# Patient Record
Sex: MALE | Race: WHITE | NOT HISPANIC OR LATINO | Employment: FULL TIME | URBAN - METROPOLITAN AREA
[De-identification: names, ages, dates, MRNs, and addresses within clinical notes are randomized per-mention and may not be internally consistent; named-entity substitution may affect disease eponyms.]

---

## 2018-12-03 ENCOUNTER — TRANSCRIBE ORDERS (OUTPATIENT)
Dept: ADMINISTRATIVE | Facility: HOSPITAL | Age: 58
End: 2018-12-03

## 2018-12-03 DIAGNOSIS — R42 DIZZINESS: Primary | ICD-10-CM

## 2018-12-06 ENCOUNTER — HOSPITAL ENCOUNTER (OUTPATIENT)
Dept: CARDIOLOGY | Facility: HOSPITAL | Age: 58
Discharge: HOME | End: 2018-12-06
Admitting: FAMILY MEDICINE

## 2018-12-06 ENCOUNTER — APPOINTMENT (OUTPATIENT)
Dept: CARDIOLOGY | Facility: HOSPITAL | Age: 58
End: 2018-12-06

## 2018-12-06 DIAGNOSIS — R42 DIZZINESS: ICD-10-CM

## 2018-12-06 PROCEDURE — 93880 EXTRACRANIAL BILAT STUDY: CPT

## 2018-12-07 LAB
BH CV XLRA MEAS LEFT CCA RATIO VEL: -65.7 CM/SEC
BH CV XLRA MEAS LEFT DIST CCA EDV: -27.6 CM/SEC
BH CV XLRA MEAS LEFT DIST CCA PSV: -65.7 CM/SEC
BH CV XLRA MEAS LEFT DIST ICA EDV: -20.5 CM/SEC
BH CV XLRA MEAS LEFT DIST ICA PSV: -48.1 CM/SEC
BH CV XLRA MEAS LEFT ICA RATIO VEL: -44 CM/SEC
BH CV XLRA MEAS LEFT ICA/CCA RATIO: 0.67
BH CV XLRA MEAS LEFT MID ICA EDV: -23.8 CM/SEC
BH CV XLRA MEAS LEFT MID ICA PSV: -50.3 CM/SEC
BH CV XLRA MEAS LEFT PROX CCA EDV: 24.6 CM/SEC
BH CV XLRA MEAS LEFT PROX CCA PSV: 87.4 CM/SEC
BH CV XLRA MEAS LEFT PROX ECA EDV: -29.9 CM/SEC
BH CV XLRA MEAS LEFT PROX ECA PSV: -108 CM/SEC
BH CV XLRA MEAS LEFT PROX ICA EDV: -19.9 CM/SEC
BH CV XLRA MEAS LEFT PROX ICA PSV: -44 CM/SEC
BH CV XLRA MEAS LEFT PROX SCLA EDV: 17.3 CM/SEC
BH CV XLRA MEAS LEFT PROX SCLA PSV: 101 CM/SEC
BH CV XLRA MEAS LEFT VERTEBRAL A EDV: 18.8 CM/SEC
BH CV XLRA MEAS LEFT VERTEBRAL A PSV: 45.7 CM/SEC
BH CV XLRA MEAS RIGHT CCA RATIO VEL: -58.6 CM/SEC
BH CV XLRA MEAS RIGHT DIST CCA EDV: -19.3 CM/SEC
BH CV XLRA MEAS RIGHT DIST CCA PSV: -58.6 CM/SEC
BH CV XLRA MEAS RIGHT DIST ICA EDV: -22.4 CM/SEC
BH CV XLRA MEAS RIGHT DIST ICA PSV: -47.1 CM/SEC
BH CV XLRA MEAS RIGHT ICA RATIO VEL: -44.6 CM/SEC
BH CV XLRA MEAS RIGHT ICA/CCA RATIO: 0.76
BH CV XLRA MEAS RIGHT MID ICA EDV: -16.1 CM/SEC
BH CV XLRA MEAS RIGHT MID ICA PSV: -30.6 CM/SEC
BH CV XLRA MEAS RIGHT PROX CCA EDV: 22.3 CM/SEC
BH CV XLRA MEAS RIGHT PROX CCA PSV: 81.5 CM/SEC
BH CV XLRA MEAS RIGHT PROX ECA EDV: -15.7 CM/SEC
BH CV XLRA MEAS RIGHT PROX ECA PSV: -84.8 CM/SEC
BH CV XLRA MEAS RIGHT PROX ICA EDV: -15.2 CM/SEC
BH CV XLRA MEAS RIGHT PROX ICA PSV: -44.6 CM/SEC
BH CV XLRA MEAS RIGHT PROX SCLA EDV: 12.6 CM/SEC
BH CV XLRA MEAS RIGHT PROX SCLA PSV: 91.1 CM/SEC
BH CV XLRA MEAS RIGHT VERTEBRAL A EDV: 10.6 CM/SEC
BH CV XLRA MEAS RIGHT VERTEBRAL A PSV: 35.7 CM/SEC
LEFT ARM BP: NORMAL MMHG
RIGHT ARM BP: NORMAL MMHG

## 2021-03-22 ENCOUNTER — BULK ORDERING (OUTPATIENT)
Dept: CASE MANAGEMENT | Facility: OTHER | Age: 61
End: 2021-03-22

## 2021-03-22 DIAGNOSIS — Z23 IMMUNIZATION DUE: ICD-10-CM

## 2023-04-06 ENCOUNTER — OFFICE VISIT (OUTPATIENT)
Dept: FAMILY MEDICINE CLINIC | Facility: CLINIC | Age: 63
End: 2023-04-06
Payer: COMMERCIAL

## 2023-04-06 VITALS
TEMPERATURE: 97.7 F | DIASTOLIC BLOOD PRESSURE: 95 MMHG | HEIGHT: 74 IN | HEART RATE: 69 BPM | WEIGHT: 239 LBS | OXYGEN SATURATION: 99 % | BODY MASS INDEX: 30.67 KG/M2 | SYSTOLIC BLOOD PRESSURE: 149 MMHG | RESPIRATION RATE: 16 BRPM

## 2023-04-06 DIAGNOSIS — M79.644 BILATERAL THUMB PAIN: Primary | ICD-10-CM

## 2023-04-06 DIAGNOSIS — M79.645 BILATERAL THUMB PAIN: Primary | ICD-10-CM

## 2023-04-06 DIAGNOSIS — I10 PRIMARY HYPERTENSION: ICD-10-CM

## 2023-04-06 PROBLEM — R73.9 HYPERGLYCEMIA: Status: ACTIVE | Noted: 2023-04-06

## 2023-04-06 PROBLEM — E78.2 MIXED HYPERLIPIDEMIA: Status: ACTIVE | Noted: 2023-04-06

## 2023-04-06 PROCEDURE — 99213 OFFICE O/P EST LOW 20 MIN: CPT | Performed by: FAMILY MEDICINE

## 2023-04-06 RX ORDER — HYDROCHLOROTHIAZIDE 25 MG/1
25 TABLET ORAL DAILY
Qty: 90 TABLET | Refills: 1 | Status: SHIPPED | OUTPATIENT
Start: 2023-04-06

## 2023-04-06 RX ORDER — VALSARTAN 160 MG/1
1 TABLET ORAL DAILY
COMMUNITY
Start: 2023-02-05

## 2023-04-06 RX ORDER — ROSUVASTATIN CALCIUM 10 MG/1
1 TABLET, COATED ORAL DAILY
COMMUNITY
Start: 2023-03-14

## 2023-04-06 RX ORDER — HYDROCHLOROTHIAZIDE 12.5 MG/1
12.5 CAPSULE, GELATIN COATED ORAL EVERY MORNING
COMMUNITY
Start: 2023-01-14 | End: 2023-04-06 | Stop reason: DRUGHIGH

## 2023-04-06 NOTE — PROGRESS NOTES
"Chief Complaint  Hypertension and thumb (Complains \"they do not work\")    Subjective    History of Present Illness {CC  Problem List  Visit  Diagnosis   Encounters  Notes  Medications  Labs  Result Review Imaging  Media :23}     David Robles presents to [facility] for Hypertension and thumb (Complains \"they do not work\").  History of Present Illness   He is here for evaluation of bilateral thumb pain.  He started working at [employer] about 5 months ago.  His initial position required him to do repetitive motions with his hands and thumbs.  About 3 months ago he started with increased pain over bilateral first MCP joints.  The pain is severe at times and his range of motion is also limited.  He has tried Tylenol as needed but this has not been helpful.  He tries to avoid anti-inflammatories because he has a prior history of borderline kidney function.  He has since changed jobs and no longer has to do repetitive motions that involve the use of his thumbs.    He is also here for follow-up of hypertension.  He is currently on valsartan 160 and HCTZ 12.5 mg daily.  His blood pressure at home is generally good first thing in the morning but rises to 140 over 90s during the day.  He denies any headaches, dizziness, or chest pain.      Objective     Vital Signs:   /95 (BP Location: Left arm, Patient Position: Sitting, Cuff Size: Adult)   Pulse 69   Temp 97.7 °F (36.5 °C) (Oral)   Resp 16   Ht 188 cm (74\")   Wt 108 kg (239 lb)   SpO2 99%   BMI 30.69 kg/m²   Body mass index is 30.69 kg/m².     Physical Exam  Constitutional:       General: He is not in acute distress.  Cardiovascular:      Rate and Rhythm: Normal rate and regular rhythm.      Heart sounds: No murmur heard.  Pulmonary:      Effort: No respiratory distress.      Breath sounds: Normal breath sounds.   Musculoskeletal:      Right hand: Tenderness present. Decreased range of motion (1st MCP).      Left hand: " Tenderness present. Decreased range of motion (1st MCP).   Neurological:      General: No focal deficit present.      Mental Status: He is alert.          Result Review  Data Reviewed:{ Labs  Result Review  Imaging  Med Tab  Media :23}                Assessment and Plan {CC Problem List  Visit Diagnosis  ROS  Review (Popup)  Health Maintenance  Quality  BestPractice  Medications  SmartSets  SnapShot Encounters  Media :23}   Diagnoses and all orders for this visit:    1. Bilateral thumb pain (Primary)  -     Ambulatory Referral to Hand Surgery    2. Primary hypertension    Other orders  -     hydroCHLOROthiazide (HYDRODIURIL) 25 MG tablet; Take 1 tablet by mouth Daily.  Dispense: 90 tablet; Refill: 1        Patient Instructions   Increase HCTZ to 25mg daily.  You can use topical voltaren gel for the thumb pain.  We are referring to hand specialist as well for this.  If possible, continue to avoid repetitive motions.    Patient was given instructions and counseling regarding his condition or for health maintenance advice on the AVS.       No follow-ups on file.    Alma Bernal MD

## 2023-04-06 NOTE — PATIENT INSTRUCTIONS
Increase HCTZ to 25mg daily.  You can use topical voltaren gel for the thumb pain.  We are referring to hand specialist as well for this.

## 2023-04-18 RX ORDER — HYDROCHLOROTHIAZIDE 12.5 MG/1
CAPSULE, GELATIN COATED ORAL
Qty: 90 CAPSULE | Refills: 1 | OUTPATIENT
Start: 2023-04-18

## 2023-04-18 NOTE — TELEPHONE ENCOUNTER
Rx Refill Note  Requested Prescriptions     Refused Prescriptions Disp Refills   • hydroCHLOROthiazide (MICROZIDE) 12.5 MG capsule [Pharmacy Med Name: HYDROCHLOROTHIAZIDE 12.5 MG CP] 90 capsule 1     Sig: TAKE 1 CAPSULE BY MOUTH EVERY DAY IN THE MORNING      Last office visit with prescribing clinician: Visit date not found     Next office visit with prescribing clinician: Visit date not found     Refused prescription because it is not the correct dosage.  Correct dosage of 25mg was sent over last week.    Melissa Mix MA  04/18/23, 10:58 EDT

## 2023-04-19 ENCOUNTER — TELEPHONE (OUTPATIENT)
Dept: FAMILY MEDICINE CLINIC | Facility: CLINIC | Age: 63
End: 2023-04-19

## 2023-04-19 NOTE — TELEPHONE ENCOUNTER
OK for HUB to read.  Pt has no  set up unable to leave message.  Referral was sent [facility]. Pt can call them to check status at [phone].

## 2023-04-19 NOTE — TELEPHONE ENCOUNTER
Caller: David Robles    Relationship to patient: Self    Best call back number: [phone]    Patient is needing: PATIENT IS REQUESTING A CALLBACK FOR AN UPDATE ON THE REFERRAL TO A HAND SURGEON.

## 2023-05-15 ENCOUNTER — OFFICE VISIT (OUTPATIENT)
Dept: FAMILY MEDICINE CLINIC | Facility: CLINIC | Age: 63
End: 2023-05-15
Payer: COMMERCIAL

## 2023-05-15 VITALS
WEIGHT: 235.9 LBS | OXYGEN SATURATION: 98 % | DIASTOLIC BLOOD PRESSURE: 80 MMHG | HEART RATE: 80 BPM | SYSTOLIC BLOOD PRESSURE: 116 MMHG | HEIGHT: 74 IN | TEMPERATURE: 98.4 F | BODY MASS INDEX: 30.27 KG/M2 | RESPIRATION RATE: 16 BRPM

## 2023-05-15 DIAGNOSIS — E78.2 MIXED HYPERLIPIDEMIA: ICD-10-CM

## 2023-05-15 DIAGNOSIS — Z86.73 HISTORY OF CVA (CEREBROVASCULAR ACCIDENT): Primary | ICD-10-CM

## 2023-05-15 DIAGNOSIS — I10 PRIMARY HYPERTENSION: ICD-10-CM

## 2023-05-15 RX ORDER — ATORVASTATIN CALCIUM 20 MG/1
20 TABLET, FILM COATED ORAL EVERY EVENING
COMMUNITY
Start: 2023-05-12 | End: 2023-05-15 | Stop reason: SDUPTHER

## 2023-05-15 RX ORDER — ATORVASTATIN CALCIUM 20 MG/1
20 TABLET, FILM COATED ORAL EVERY EVENING
Qty: 90 TABLET | Refills: 1 | Status: SHIPPED | OUTPATIENT
Start: 2023-05-15

## 2023-05-15 NOTE — PATIENT INSTRUCTIONS
Continue your current medications--plavix, aspirin, valsaran, and HCTZ.    Begin atorvastatin as recommended at discharge.  Do the zio patch to evaluate for atrial fibrillation.  Follow up with neurologist as scheduled.

## 2023-05-15 NOTE — PROGRESS NOTES
"Chief Complaint  Hospital Follow Up Visit (Stroke )    Subjective    History of Present Illness {CC  Problem List  Visit  Diagnosis   Encounters  Notes  Medications  Labs  Result Review Imaging  Media :23}     David Robles presents to [facility] for Hospital Follow Up Visit (Stroke ).  History of Present Illness   He presents for hospital follow-up.  He was admitted at [facility] last week for 1 night.  Hospital records are not available due to [place] computer issue.  He awoke with right arm heaviness.  He initially thought it was related to sleeping on it wrong but it persisted and he just felt off so he went to the emergency room and was discovered to have a stroke.  He reports that his labs and testing in the hospital did not reveal an etiology for the stroke.  He reports that carotid ultrasound did show small amount of plaque but nothing hemodynamically significant.  He reports an echocardiogram was also normal.  His symptoms resolved in less than 24 hours.      He was discharged on Plavix to take in addition to aspirin.  Prior to his hospitalization he had already been taking aspirin daily.  The only other medication change that was made was the discontinuation of rosuvastatin and addition of atorvastatin.  However he reports that he was not given atorvastatin so he does need a prescription for this.  Since discharge he has had no symptoms at all.    He is scheduled to follow-up in with neurologist in 4 to 6 weeks.  He is also scheduled to do wear a patch to check for atrial relation or other arrhythmia.      Objective     Vital Signs:   /80 (BP Location: Left arm, Patient Position: Sitting, Cuff Size: Adult)   Pulse 80   Temp 98.4 °F (36.9 °C) (Oral)   Resp 16   Ht 188 cm (74\")   Wt 107 kg (235 lb 14.4 oz)   SpO2 98%   BMI 30.29 kg/m²   Body mass index is 30.29 kg/m².     Physical Exam  Constitutional:       General: He is not in acute distress.     " Appearance: Normal appearance.   Cardiovascular:      Rate and Rhythm: Normal rate and regular rhythm.      Pulses: Normal pulses.      Heart sounds: No murmur heard.  Pulmonary:      Effort: No respiratory distress.      Breath sounds: Normal breath sounds.   Neurological:      General: No focal deficit present.   Psychiatric:         Behavior: Behavior normal.          Result Review  Data Reviewed:{ Labs  Result Review  Imaging  Med Tab  Media :23}                Assessment and Plan {CC Problem List  Visit Diagnosis  ROS  Review (Popup)  Health Maintenance  Quality  BestPractice  Medications  SmartSets  SnapShot Encounters  Media :23}   Diagnoses and all orders for this visit:    1. History of CVA (cerebrovascular accident) (Primary)    2. Primary hypertension    3. Mixed hyperlipidemia    Other orders  -     atorvastatin (LIPITOR) 20 MG tablet; Take 1 tablet by mouth Every Evening.  Dispense: 90 tablet; Refill: 1        Patient Instructions   Continue your current medications--plavix, aspirin, valsaran, and HCTZ.    Begin atorvastatin as recommended at discharge.  Do the zio patch to evaluate for atrial fibrillation.  Follow up with neurologist as scheduled.         Patient was given instructions and counseling regarding his condition or for health maintenance advice on the AVS.       Return in about 3 months (around 8/15/2023) for Recheck.  Plan fasting labs at next visit.  Call sooner if needed.    Alma Bernal MD

## 2023-05-17 ENCOUNTER — TELEPHONE (OUTPATIENT)
Dept: FAMILY MEDICINE CLINIC | Facility: CLINIC | Age: 63
End: 2023-05-17
Payer: COMMERCIAL

## 2023-05-17 NOTE — TELEPHONE ENCOUNTER
Plavix and Clopidrogel are the same medication.  When I saw him in the office he stated that he had Plavix from the pharmacy.  If this is not correct and he needs a prescription we can certainly send that over for him.

## 2023-05-17 NOTE — TELEPHONE ENCOUNTER
Caller: David Robles    Relationship: Self    Best call back number: [phone]    What medication are you requesting: PLAVIX    If a prescription is needed, what is your preferred pharmacy and phone number: [facility]Y #[id number] - [place] [address] AT [facility] - [phone]  - [phone] FX     Additional notes: PATIENT STATES THAT HE HAD A STROKE LAST WEEK. WHILE IN THE HOSPITAL, THEY CHANGED SOME OF HIS MEDICATIONS, AND ADVISED OTHERS. HE GOT A NEW STATIN, BUT WAS ALSO ADVISED TO START TAKING PLAVIX AS A BLOOD THINNER. REQUESTS PRESCRIPTION FOR MEDICATION TO BE SENT TO PHARMACY. PLEASE CALL AND ADVISE

## 2023-05-18 RX ORDER — CLOPIDOGREL BISULFATE 75 MG/1
75 TABLET ORAL DAILY
Qty: 30 TABLET | Refills: 1 | Status: SHIPPED | OUTPATIENT
Start: 2023-05-18

## 2023-05-18 NOTE — TELEPHONE ENCOUNTER
Pt called back, he does not have a script at all for Plavix/Clopidrogel. Can you please send to [facility] on [address]?

## 2023-05-18 NOTE — TELEPHONE ENCOUNTER
Pt said that he doesn't know what I'm talking about. He doesn't know the name of his medications. He said he would have his wife come in and show me all of his medications, but she might not. Not sure what to do at this point. Will wait for a response from patient or his wife

## 2023-06-11 RX ORDER — CLOPIDOGREL BISULFATE 75 MG/1
TABLET ORAL
Qty: 30 TABLET | Refills: 2 | Status: SHIPPED | OUTPATIENT
Start: 2023-06-11

## 2023-06-12 ENCOUNTER — TELEPHONE (OUTPATIENT)
Dept: FAMILY MEDICINE CLINIC | Facility: CLINIC | Age: 63
End: 2023-06-12
Payer: COMMERCIAL

## 2023-06-12 NOTE — TELEPHONE ENCOUNTER
Pts BP medicine was doubled because his BP was running high.  Now it's running low 86/55.  He wants to know if he needs to decrease it again or come in?

## 2023-06-12 NOTE — TELEPHONE ENCOUNTER
Just to clarify....  His blood pressure medication in the chart is valsartan 160mg and hydrochlorothiazide 25mg.  It looks like his hydrochlorothiazide was doubled in April.  It think it is reasonable to cut this back down to 12.5mg but please schedule a bp check with Dr. Bernal in follow up.

## 2023-06-13 NOTE — TELEPHONE ENCOUNTER
Dr. Saldana said:    As long as he feels better with the change, it can be left in August       Patient is aware

## 2023-06-13 NOTE — TELEPHONE ENCOUNTER
Pt aware, he's going to start cutting his hctz pill in half and see if that will help. He said he's feeling a lot better, so I don't know if you want him to keep his already scheduled appt in August or do you want him to be seen sooner? I told him to call back if he starts feeling lightheaded/lethargic again.

## 2023-06-15 RX ORDER — ROSUVASTATIN CALCIUM 10 MG/1
TABLET, COATED ORAL
Qty: 90 TABLET | Refills: 1 | OUTPATIENT
Start: 2023-06-15

## 2023-08-07 RX ORDER — VALSARTAN 160 MG/1
TABLET ORAL
Qty: 90 TABLET | Refills: 0 | Status: SHIPPED | OUTPATIENT
Start: 2023-08-07

## 2023-08-07 NOTE — TELEPHONE ENCOUNTER
Rx Refill Note  Requested Prescriptions     Pending Prescriptions Disp Refills    valsartan (DIOVAN) 160 MG tablet [Pharmacy Med Name: VALSARTAN 160 MG TABLET] 90 tablet 1     Sig: TAKE 1 TABLET BY MOUTH EVERY DAY      Last office visit with prescribing clinician: 5/15/2023   Last telemedicine visit with prescribing clinician: Visit date not found   Next office visit with prescribing clinician: 8/16/2023                         Would you like a call back once the refill request has been completed: [] Yes [] No    If the office needs to give you a call back, can they leave a voicemail: [] Yes [] No    Haily Gaston MA  08/07/23, 08:14 EDT

## 2023-08-16 ENCOUNTER — OFFICE VISIT (OUTPATIENT)
Dept: FAMILY MEDICINE CLINIC | Facility: CLINIC | Age: 63
End: 2023-08-16
Payer: COMMERCIAL

## 2023-08-16 VITALS
DIASTOLIC BLOOD PRESSURE: 78 MMHG | SYSTOLIC BLOOD PRESSURE: 115 MMHG | OXYGEN SATURATION: 99 % | WEIGHT: 214 LBS | BODY MASS INDEX: 27.46 KG/M2 | HEART RATE: 63 BPM | HEIGHT: 74 IN

## 2023-08-16 DIAGNOSIS — Z86.73 HISTORY OF CVA (CEREBROVASCULAR ACCIDENT): ICD-10-CM

## 2023-08-16 DIAGNOSIS — I10 PRIMARY HYPERTENSION: Primary | ICD-10-CM

## 2023-08-16 DIAGNOSIS — E78.2 MIXED HYPERLIPIDEMIA: ICD-10-CM

## 2023-08-16 PROCEDURE — 99214 OFFICE O/P EST MOD 30 MIN: CPT | Performed by: FAMILY MEDICINE

## 2023-08-16 RX ORDER — FLUTICASONE PROPIONATE 50 MCG
2 SPRAY, SUSPENSION (ML) NASAL 2 TIMES DAILY
COMMUNITY
Start: 2023-08-04

## 2023-08-16 NOTE — PATIENT INSTRUCTIONS
Continue your current medications, healthy diet and regular exercise.  Per neurologist, you can stop the plavix after 90 days.  Order given for labs. You should receive a call or my chart message with your test results. If you have not received your results after 7-10 days, please contact the office.    Continue to monitor blood pressure and if remains low, try reducing the HCTZ to 1/2 a pill daily instead of stopping entirely.

## 2023-08-16 NOTE — PROGRESS NOTES
"Chief Complaint  Cerebrovascular Accident (F/u), Hypertension, and Hyperlipidemia    Subjective    History of Present Illness {CC  Problem List  Visit  Diagnosis   Encounters  Notes  Medications  Labs  Result Review Imaging  Media :23}     David Robles presents to [facility] for Cerebrovascular Accident (F/u), Hypertension, and Hyperlipidemia.  Cerebrovascular Accident    Hypertension  Hypertensive end-organ damage includes CVA.   Hyperlipidemia     He presents for follow up of stroke.  He has had no further symptoms.  He is exercising regularly and has changed his diet and lost more than 20 pounds. He is currently on aspirin and plavix. He was seen by neurologist and told that after 90 days he can discontinue the plavix and remain only on aspirin.     He is also here for follow up of hypertension.  He is currently on valsartan 160 and HCTZ 25.  He had some low blood pressure and tried stopping the HCTZ but his blood pressure went back to systolic of 150-160s so he resumed it and has not had further issue.      He is also here for follow up of hyperlipidemia. He is on rosuvastatin 10mg daily and reports good compliance and tolerability. He is due recheck, but his insurance does not cover [facility] so he is requesting an order.       Objective     Vital Signs:   /78   Pulse 63   Ht 188 cm (74\")   Wt 97.1 kg (214 lb)   SpO2 99%   BMI 27.48 kg/mý   Body mass index is 27.48 kg/mý.     Physical Exam  Constitutional:       General: He is not in acute distress.  Cardiovascular:      Rate and Rhythm: Normal rate and regular rhythm.      Heart sounds: No murmur heard.  Pulmonary:      Effort: No respiratory distress.      Breath sounds: Normal breath sounds.   Neurological:      General: No focal deficit present.      Mental Status: He is alert.   Psychiatric:         Behavior: Behavior normal.        Result Review  Data Reviewed:{ Labs  Result Review  Imaging  Med Tab  " Media :23}                Assessment and Plan {CC Problem List  Visit Diagnosis  ROS  Review (Popup)  Health Maintenance  Quality  BestPractice  Medications  SmartSets  SnapShot Encounters  Media :23}   Diagnoses and all orders for this visit:    1. Primary hypertension (Primary)  -     Comprehensive Metabolic Panel; Future    2. Mixed hyperlipidemia  -     Lipid Panel; Future    3. History of CVA (cerebrovascular accident)        Patient Instructions   Continue your current medications, healthy diet and regular exercise.  Per neurologist, you can stop the plavix after 90 days.  Order given for labs. You should receive a call or my chart message with your test results. If you have not received your results after 7-10 days, please contact the office.    Continue to monitor blood pressure and if remains low, try reducing the HCTZ to 1/2 a pill daily instead of stopping entirely.       Patient was given instructions and counseling regarding his condition or for health maintenance advice on the AVS.       Return in about 6 months (around 2/16/2024) for Annual.    Alma Bernal MD

## 2023-09-07 RX ORDER — CLOPIDOGREL BISULFATE 75 MG/1
TABLET ORAL
Qty: 90 TABLET | Refills: 0 | Status: SHIPPED | OUTPATIENT
Start: 2023-09-07

## 2023-10-02 ENCOUNTER — TELEPHONE (OUTPATIENT)
Dept: FAMILY MEDICINE CLINIC | Facility: CLINIC | Age: 63
End: 2023-10-02

## 2023-10-02 NOTE — TELEPHONE ENCOUNTER
Caller: David Robles    Relationship: Self    Best call back number: [phone]     What is the best time to reach you: ANYTIME     Who are you requesting to speak with (clinical staff, provider,  specific staff member):  STAFF    What was the call regarding: PATIENT IS REQUESTING TO BE WORKED IN FOR A PHYSICAL PRIOR TO THE FIRST OF THE YEAR. ..PLEASE ADVISE

## 2023-10-05 RX ORDER — HYDROCHLOROTHIAZIDE 25 MG/1
TABLET ORAL
Qty: 90 TABLET | Refills: 1 | Status: SHIPPED | OUTPATIENT
Start: 2023-10-05

## 2023-10-05 NOTE — TELEPHONE ENCOUNTER
Rx Refill Note  Requested Prescriptions     Pending Prescriptions Disp Refills    hydroCHLOROthiazide (HYDRODIURIL) 25 MG tablet [Pharmacy Med Name: HYDROCHLOROTHIAZIDE 25 MG TAB] 90 tablet 1     Sig: TAKE 1 TABLET BY MOUTH EVERY DAY      Last office visit with prescribing clinician: 8/16/2023   Last telemedicine visit with prescribing clinician: Visit date not found   Next office visit with prescribing clinician: 12/8/2023       Ermelinda Taveras  10/05/23, 08:01 EDT

## 2023-11-02 ENCOUNTER — TELEPHONE (OUTPATIENT)
Dept: FAMILY MEDICINE CLINIC | Facility: CLINIC | Age: 63
End: 2023-11-02

## 2023-11-02 DIAGNOSIS — Z12.11 SCREENING FOR COLON CANCER: Primary | ICD-10-CM

## 2023-11-02 NOTE — TELEPHONE ENCOUNTER
I placed the referral for colonoscopy with his requested dates. I'm not sure how far out they are scheduling so can't guarantee those dates, but will try.

## 2023-11-02 NOTE — TELEPHONE ENCOUNTER
Caller: David Robles    Relationship: Self    Best call back number: [phone]     What is the medical concern/diagnosis: COLONOSCOPY    Any additional details: PATIENT REQUESTS A REFERRAL FOR A COLONOSCOPY. HE WOULD LIKE TO SCHEDULE IT FOR 12/6, 12/7,12/8, 12/11, OR 12/12. PLEASE CALL TO FOLLOW UP

## 2023-11-10 RX ORDER — VALSARTAN 160 MG/1
TABLET ORAL
Qty: 90 TABLET | Refills: 3 | Status: SHIPPED | OUTPATIENT
Start: 2023-11-10

## 2023-11-10 NOTE — TELEPHONE ENCOUNTER
Rx Refill Note  Requested Prescriptions     Pending Prescriptions Disp Refills    valsartan (DIOVAN) 160 MG tablet [Pharmacy Med Name: VALSARTAN 160 MG TABLET] 90 tablet 0     Sig: TAKE 1 TABLET BY MOUTH EVERY DAY      Last office visit with prescribing clinician: 8/16/2023   Last telemedicine visit with prescribing clinician: Visit date not found   Next office visit with prescribing clinician: 12/8/2023       Ermelinda Taveras  11/10/23, 08:10 EST

## 2023-12-04 RX ORDER — ATORVASTATIN CALCIUM 20 MG/1
20 TABLET, FILM COATED ORAL EVERY EVENING
Qty: 90 TABLET | Refills: 3 | Status: SHIPPED | OUTPATIENT
Start: 2023-12-04

## 2023-12-04 RX ORDER — CLOPIDOGREL BISULFATE 75 MG/1
TABLET ORAL
Qty: 90 TABLET | Refills: 0 | Status: SHIPPED | OUTPATIENT
Start: 2023-12-04 | End: 2023-12-08 | Stop reason: ALTCHOICE

## 2023-12-04 NOTE — TELEPHONE ENCOUNTER
Rx Refill Note  Requested Prescriptions     Pending Prescriptions Disp Refills    atorvastatin (LIPITOR) 20 MG tablet [Pharmacy Med Name: ATORVASTATIN 20 MG TABLET] 90 tablet 1     Sig: TAKE 1 TABLET BY MOUTH EVERY DAY IN THE EVENING      Last office visit with prescribing clinician: 8/16/2023   Last telemedicine visit with prescribing clinician: Visit date not found   Next office visit with prescribing clinician: 12/8/2023       Ermelinda Taveras  12/04/23, 08:15 EST

## 2023-12-08 ENCOUNTER — OFFICE VISIT (OUTPATIENT)
Dept: FAMILY MEDICINE CLINIC | Facility: CLINIC | Age: 63
End: 2023-12-08
Payer: COMMERCIAL

## 2023-12-08 ENCOUNTER — TELEPHONE (OUTPATIENT)
Dept: FAMILY MEDICINE CLINIC | Facility: CLINIC | Age: 63
End: 2023-12-08

## 2023-12-08 VITALS
WEIGHT: 203 LBS | HEART RATE: 65 BPM | DIASTOLIC BLOOD PRESSURE: 61 MMHG | BODY MASS INDEX: 26.05 KG/M2 | OXYGEN SATURATION: 100 % | HEIGHT: 74 IN | SYSTOLIC BLOOD PRESSURE: 92 MMHG

## 2023-12-08 DIAGNOSIS — Z86.73 HISTORY OF CVA (CEREBROVASCULAR ACCIDENT): ICD-10-CM

## 2023-12-08 DIAGNOSIS — R73.9 HYPERGLYCEMIA: ICD-10-CM

## 2023-12-08 DIAGNOSIS — Z00.00 WELL ADULT EXAM: Primary | ICD-10-CM

## 2023-12-08 DIAGNOSIS — Z12.5 SCREENING PSA (PROSTATE SPECIFIC ANTIGEN): ICD-10-CM

## 2023-12-08 DIAGNOSIS — I10 PRIMARY HYPERTENSION: ICD-10-CM

## 2023-12-08 DIAGNOSIS — E78.2 MIXED HYPERLIPIDEMIA: ICD-10-CM

## 2023-12-08 PROCEDURE — 99397 PER PM REEVAL EST PAT 65+ YR: CPT | Performed by: FAMILY MEDICINE

## 2023-12-08 RX ORDER — IMIQUIMOD 12.5 MG/.25G
CREAM TOPICAL
COMMUNITY
Start: 2023-11-09

## 2023-12-08 NOTE — TELEPHONE ENCOUNTER
Pt states he is taking these meds  Valsartan 160 mg  Atorvastatin 20 mg  Hctz 25 mg   Low dose aspirin

## 2023-12-08 NOTE — PATIENT INSTRUCTIONS
Continue your current medications.   Aim for 150 minutes of aerobic exercise weekly.  You had lab tests today. You should receive a call or my chart message with your test results. If you have not received your results in the next 7-10 days, please contact the office.    Continue to monitor blood pressure. If staying below 110/70, can try reducing HCTZ dose.  LDL cholesterol goal is under 70.  Proceed with colonoscopy as planned. If you need help scheduling, let us know.

## 2023-12-08 NOTE — PROGRESS NOTES
Chief Complaint  Annual Exam    Subjective    History of Present Illness {CC  Problem List  Visit  Diagnosis   Encounters  Notes  Medications  Labs  Result Review Imaging  Media :23}     David Robles presents to [facility] for Annual Exam.  He is  and has 5 grown children. He works full time in [employer]. He is due colonoscopy and has the paperwork to schedule. His last PSA was last year and he would like PSA today. He has never been a smoker. He is up to date on dental and eye exams.  He is exercising daily. He had biometric screening for work last month and he questions the validity of the testing. He has been working on diet and exercise and is fastign for labs.     Their chronic medical conditions were reviewed and are stable unless noted otherwise below. He has a history of hypertension, hyperglycemia, and hyperlipidemia. He also has a history of CVA 5/2023 and has done well. He took plavix for 90 days and is now on low dose aspirin. He has a zio monitor to assess for atrial fibrillation. He has had no palpitations and so far no afib has been identified. His blood pressure has been low normal, but he denies any dizziness.    History of Present Illness     Social History     Socioeconomic History    Marital status:    Tobacco Use    Smoking status: Never    Smokeless tobacco: Never   Vaping Use    Vaping Use: Never used   Substance and Sexual Activity    Alcohol use: Never    Drug use: Never    Sexual activity: Defer     Partners: Female        Review of Systems   Constitutional:  Negative for fatigue.   HENT:  Negative for ear pain.    Eyes:  Negative for pain and visual disturbance.   Respiratory:  Negative for cough and shortness of breath.    Cardiovascular:  Negative for chest pain and palpitations.   Gastrointestinal:  Negative for abdominal pain, constipation and diarrhea.   Genitourinary:  Negative for dysuria.   Musculoskeletal:  Positive for  "arthralgias (thumb and elbow pain).   Skin:  Negative for rash.   Allergic/Immunologic: Negative for environmental allergies.   Neurological:  Negative for dizziness and headaches.   Psychiatric/Behavioral:  Negative for dysphoric mood. The patient is not nervous/anxious.         Objective       Vital Signs:   BP 92/61 (BP Location: Left arm, Patient Position: Sitting, Cuff Size: Adult)   Pulse 65   Ht 188 cm (74.02\")   Wt 92.1 kg (203 lb)   SpO2 100%   BMI 26.05 kg/m²     Body mass index is 26.05 kg/m².     PHQ-9 Depression Screening  Little interest or pleasure in doing things?     Feeling down, depressed, or hopeless?     Trouble falling or staying asleep, or sleeping too much?     Feeling tired or having little energy?     Poor appetite or overeating?     Feeling bad about yourself - or that you are a failure or have let yourself or your family down?     Trouble concentrating on things, such as reading the newspaper or watching television?     Moving or speaking so slowly that other people could have noticed? Or the opposite - being so fidgety or restless that you have been moving around a lot more than usual?     Thoughts that you would be better off dead, or of hurting yourself in some way?     PHQ-9 Total Score     If you checked off any problems, how difficult have these problems made it for you to do your work, take care of things at home, or get along with other people?           Physical Exam  Constitutional:       General: He is not in acute distress.  HENT:      Head: Normocephalic and atraumatic.      Nose: No rhinorrhea.      Mouth/Throat:      Mouth: Mucous membranes are moist.   Eyes:      Extraocular Movements: Extraocular movements intact.      Conjunctiva/sclera: Conjunctivae normal.   Cardiovascular:      Rate and Rhythm: Normal rate and regular rhythm.      Heart sounds: No murmur heard.  Pulmonary:      Effort: No respiratory distress.      Breath sounds: Normal breath sounds. "   Abdominal:      General: There is no distension.      Palpations: Abdomen is soft.      Tenderness: There is no abdominal tenderness.   Musculoskeletal:      Right lower leg: No edema.      Left lower leg: No edema.   Lymphadenopathy:      Cervical: No cervical adenopathy.   Skin:     General: Skin is warm.   Neurological:      General: No focal deficit present.      Mental Status: He is alert.   Psychiatric:         Behavior: Behavior normal.          Result Review  Data Reviewed:{ Labs  Result Review  Imaging  Med Tab  Media :23}                Assessment and Plan {CC Problem List  Visit Diagnosis  ROS  Review (Popup)  Health Maintenance  Quality  BestPractice  Medications  SmartSets  SnapShot Encounters  Media :23}   Diagnoses and all orders for this visit:    1. Well adult exam (Primary)  -     Comprehensive Metabolic Panel  -     Lipid Panel  -     Hemoglobin A1c  -     PSA Screen    2. Primary hypertension  -     Comprehensive Metabolic Panel    3. Mixed hyperlipidemia  -     Lipid Panel    4. Hyperglycemia  -     Comprehensive Metabolic Panel  -     Hemoglobin A1c    5. History of CVA (cerebrovascular accident)    6. Screening PSA (prostate specific antigen)  -     PSA Screen        Patient Instructions   Continue your current medications.   Aim for 150 minutes of aerobic exercise weekly.  You had lab tests today. You should receive a call or my chart message with your test results. If you have not received your results in the next 7-10 days, please contact the office.    Continue to monitor blood pressure. If staying below 110/70, can try reducing HCTZ dose.  LDL cholesterol goal is under 70.  Proceed with colonoscopy as planned. If you need help scheduling, let us know.     Routine health maintenance, immunizations and cancer screenings were discussed and encouraged.  He declines vaccines today but will consider.    Patient was given instructions and counseling regarding his condition or  for health maintenance advice on the AVS.       No follow-ups on file.    Alma Bernal MD

## 2023-12-09 LAB
ALBUMIN SERPL-MCNC: 4.8 G/DL (ref 3.9–4.9)
ALBUMIN/GLOB SERPL: 2 {RATIO} (ref 1.2–2.2)
ALP SERPL-CCNC: 63 IU/L (ref 44–121)
ALT SERPL-CCNC: 20 IU/L (ref 0–44)
AST SERPL-CCNC: 23 IU/L (ref 0–40)
BILIRUB SERPL-MCNC: 1 MG/DL (ref 0–1.2)
BUN SERPL-MCNC: 23 MG/DL (ref 8–27)
BUN/CREAT SERPL: 19 (ref 10–24)
CALCIUM SERPL-MCNC: 9.6 MG/DL (ref 8.6–10.2)
CHLORIDE SERPL-SCNC: 102 MMOL/L (ref 96–106)
CHOLEST SERPL-MCNC: 109 MG/DL (ref 100–199)
CO2 SERPL-SCNC: 23 MMOL/L (ref 20–29)
CREAT SERPL-MCNC: 1.19 MG/DL (ref 0.76–1.27)
EGFRCR SERPLBLD CKD-EPI 2021: 68 ML/MIN/1.73
GLOBULIN SER CALC-MCNC: 2.4 G/DL (ref 1.5–4.5)
GLUCOSE SERPL-MCNC: 98 MG/DL (ref 70–99)
HBA1C MFR BLD: 6.1 % (ref 4.8–5.6)
HDLC SERPL-MCNC: 43 MG/DL
LDLC SERPL CALC-MCNC: 46 MG/DL (ref 0–99)
POTASSIUM SERPL-SCNC: 4.7 MMOL/L (ref 3.5–5.2)
PROT SERPL-MCNC: 7.2 G/DL (ref 6–8.5)
PSA SERPL-MCNC: 0.6 NG/ML (ref 0–4)
SODIUM SERPL-SCNC: 140 MMOL/L (ref 134–144)
TRIGL SERPL-MCNC: 111 MG/DL (ref 0–149)
VLDLC SERPL CALC-MCNC: 20 MG/DL (ref 5–40)

## 2024-03-27 RX ORDER — HYDROCHLOROTHIAZIDE 25 MG/1
TABLET ORAL
Qty: 90 TABLET | Refills: 1 | Status: SHIPPED | OUTPATIENT
Start: 2024-03-27

## 2024-04-01 ENCOUNTER — PATIENT MESSAGE (OUTPATIENT)
Dept: FAMILY MEDICINE CLINIC | Facility: CLINIC | Age: 64
End: 2024-04-01
Payer: COMMERCIAL

## 2024-06-10 ENCOUNTER — OFFICE VISIT (OUTPATIENT)
Dept: FAMILY MEDICINE CLINIC | Facility: CLINIC | Age: 64
End: 2024-06-10
Payer: COMMERCIAL

## 2024-06-10 VITALS
DIASTOLIC BLOOD PRESSURE: 71 MMHG | WEIGHT: 211.3 LBS | BODY MASS INDEX: 27.12 KG/M2 | HEART RATE: 69 BPM | OXYGEN SATURATION: 97 % | SYSTOLIC BLOOD PRESSURE: 106 MMHG | HEIGHT: 74 IN | RESPIRATION RATE: 16 BRPM

## 2024-06-10 DIAGNOSIS — R73.9 HYPERGLYCEMIA: ICD-10-CM

## 2024-06-10 DIAGNOSIS — Z86.73 HISTORY OF CVA (CEREBROVASCULAR ACCIDENT): ICD-10-CM

## 2024-06-10 DIAGNOSIS — I48.0 PAROXYSMAL ATRIAL FIBRILLATION: ICD-10-CM

## 2024-06-10 DIAGNOSIS — I10 PRIMARY HYPERTENSION: Primary | ICD-10-CM

## 2024-06-10 DIAGNOSIS — Z79.01 CHRONIC ANTICOAGULATION: ICD-10-CM

## 2024-06-10 DIAGNOSIS — E78.2 MIXED HYPERLIPIDEMIA: ICD-10-CM

## 2024-06-10 PROCEDURE — 99214 OFFICE O/P EST MOD 30 MIN: CPT | Performed by: FAMILY MEDICINE

## 2024-06-10 RX ORDER — DIPHENOXYLATE HYDROCHLORIDE AND ATROPINE SULFATE 2.5; .025 MG/1; MG/1
1 TABLET ORAL DAILY
COMMUNITY

## 2024-06-10 RX ORDER — RIVAROXABAN 20 MG/1
20 TABLET, FILM COATED ORAL
COMMUNITY

## 2024-06-10 RX ORDER — CHLORAL HYDRATE 500 MG
3000 CAPSULE ORAL 2 TIMES DAILY WITH MEALS
COMMUNITY

## 2024-06-10 NOTE — PATIENT INSTRUCTIONS
Continue your current medications, healthy diet and regular exercise.  You had lab tests today. You should receive a call or my chart message with your test results. If you have not received your results in the next 7-10 days, please contact the office.    Follow up with cardiology yearly.

## 2024-06-10 NOTE — PROGRESS NOTES
"Chief Complaint  Hypertension    Subjective    History of Present Illness {CC  Problem List  Visit  Diagnosis   Encounters  Notes  Medications  Labs  Result Review Imaging  Media :23}     David Robles presents to [facility] for Hypertension.  Hypertension       He presents for follow-up of hypertension.  His blood pressure has been well-controlled on valsartan 160 mg and hydrochlorothiazide 25 mg daily.  He denies any headaches, dizziness, or chest pain.    He is also here for follow-up of hyperlipidemia.  He is currently on atorvastatin 20 mg daily.  He reports good compliance and tolerability of the medication.  His last cholesterol was 109 with LDL of 46, HDL 43, and triglycerides of 111.  He is fasting for recheck today.    He also has a history of hyperglycemia his last A1c was 6.1.  He is not on medication for his sugar but is trying to control this through diet and exercise.    He also has a history of CVA and he is on monitor did show at least 2 episodes of atrial fibrillation.  He has been changed to Xarelto and is no longer taking the aspirin.  He denies any chest pain or palpitations.  He last saw the cardiologist in March and was told to recheck in 1 year.      Objective     Vital Signs:   /71   Pulse 69   Resp 16   Ht 188 cm (74.02\")   Wt 95.8 kg (211 lb 4.8 oz)   SpO2 97%   BMI 27.11 kg/m²   Body mass index is 27.11 kg/m².     Physical Exam  Constitutional:       General: He is not in acute distress.  Cardiovascular:      Rate and Rhythm: Normal rate and regular rhythm.      Heart sounds: No murmur heard.  Pulmonary:      Effort: No respiratory distress.      Breath sounds: Normal breath sounds.   Neurological:      General: No focal deficit present.      Mental Status: He is alert.   Psychiatric:         Behavior: Behavior normal.          Result Review  Data Reviewed:{ Labs  Result Review  Imaging  Med Tab  Media :23}                Assessment " and Plan {CC Problem List  Visit Diagnosis  ROS  Review (Popup)  Health Maintenance  Quality  BestPractice  Medications  SmartSets  SnapShot Encounters  Media :23}   Diagnoses and all orders for this visit:    1. Primary hypertension (Primary)  Comments:  Continue valsartan and HCTZ.  Orders:  -     Comprehensive Metabolic Panel    2. Mixed hyperlipidemia  Comments:  Continue atorvastatin.  Orders:  -     Lipid Panel    3. Hyperglycemia  -     Hemoglobin A1c    4. Paroxysmal atrial fibrillation  Comments:  Continue Xarelto.    5. History of CVA (cerebrovascular accident)    6. Chronic anticoagulation  -     CBC & Differential        Patient Instructions   Continue your current medications, healthy diet and regular exercise.  You had lab tests today. You should receive a call or my chart message with your test results. If you have not received your results in the next 7-10 days, please contact the office.    Follow up with cardiology yearly.         Patient was given instructions and counseling regarding his condition or for health maintenance advice on the AVS.       Return in about 6 months (around 12/10/2024) for Annual.    Alma Bernal MD

## 2024-06-11 LAB
ALBUMIN SERPL-MCNC: 4.7 G/DL (ref 3.5–5.2)
ALBUMIN/GLOB SERPL: 2.2 G/DL
ALP SERPL-CCNC: 55 U/L (ref 39–117)
ALT SERPL-CCNC: 17 U/L (ref 1–41)
AST SERPL-CCNC: 21 U/L (ref 1–40)
BASOPHILS # BLD AUTO: 0.02 10*3/MM3 (ref 0–0.2)
BASOPHILS NFR BLD AUTO: 0.3 % (ref 0–1.5)
BILIRUB SERPL-MCNC: 1 MG/DL (ref 0–1.2)
BUN SERPL-MCNC: 22 MG/DL (ref 8–23)
BUN/CREAT SERPL: 22.2 (ref 7–25)
CALCIUM SERPL-MCNC: 9.7 MG/DL (ref 8.6–10.5)
CHLORIDE SERPL-SCNC: 104 MMOL/L (ref 98–107)
CHOLEST SERPL-MCNC: 120 MG/DL (ref 0–200)
CO2 SERPL-SCNC: 26.3 MMOL/L (ref 22–29)
CREAT SERPL-MCNC: 0.99 MG/DL (ref 0.76–1.27)
EGFRCR SERPLBLD CKD-EPI 2021: 84.5 ML/MIN/1.73
EOSINOPHIL # BLD AUTO: 0.02 10*3/MM3 (ref 0–0.4)
EOSINOPHIL NFR BLD AUTO: 0.3 % (ref 0.3–6.2)
ERYTHROCYTE [DISTWIDTH] IN BLOOD BY AUTOMATED COUNT: 12.8 % (ref 12.3–15.4)
GLOBULIN SER CALC-MCNC: 2.1 GM/DL
GLUCOSE SERPL-MCNC: 111 MG/DL (ref 65–99)
HBA1C MFR BLD: 6.2 % (ref 4.8–5.6)
HCT VFR BLD AUTO: 44 % (ref 37.5–51)
HDLC SERPL-MCNC: 49 MG/DL (ref 40–60)
HGB BLD-MCNC: 15 G/DL (ref 13–17.7)
IMM GRANULOCYTES # BLD AUTO: 0.02 10*3/MM3 (ref 0–0.05)
IMM GRANULOCYTES NFR BLD AUTO: 0.3 % (ref 0–0.5)
LDLC SERPL CALC-MCNC: 53 MG/DL (ref 0–100)
LYMPHOCYTES # BLD AUTO: 2.02 10*3/MM3 (ref 0.7–3.1)
LYMPHOCYTES NFR BLD AUTO: 27.2 % (ref 19.6–45.3)
MCH RBC QN AUTO: 31.7 PG (ref 26.6–33)
MCHC RBC AUTO-ENTMCNC: 34.1 G/DL (ref 31.5–35.7)
MCV RBC AUTO: 93 FL (ref 79–97)
MONOCYTES # BLD AUTO: 0.61 10*3/MM3 (ref 0.1–0.9)
MONOCYTES NFR BLD AUTO: 8.2 % (ref 5–12)
NEUTROPHILS # BLD AUTO: 4.74 10*3/MM3 (ref 1.7–7)
NEUTROPHILS NFR BLD AUTO: 63.7 % (ref 42.7–76)
NRBC BLD AUTO-RTO: 0 /100 WBC (ref 0–0.2)
PLATELET # BLD AUTO: 220 10*3/MM3 (ref 140–450)
POTASSIUM SERPL-SCNC: 5 MMOL/L (ref 3.5–5.2)
PROT SERPL-MCNC: 6.8 G/DL (ref 6–8.5)
RBC # BLD AUTO: 4.73 10*6/MM3 (ref 4.14–5.8)
SODIUM SERPL-SCNC: 141 MMOL/L (ref 136–145)
TRIGL SERPL-MCNC: 98 MG/DL (ref 0–150)
VLDLC SERPL CALC-MCNC: 18 MG/DL (ref 5–40)
WBC # BLD AUTO: 7.43 10*3/MM3 (ref 3.4–10.8)

## 2024-06-20 RX ORDER — ATORVASTATIN CALCIUM 20 MG/1
20 TABLET, FILM COATED ORAL EVERY EVENING
Qty: 90 TABLET | Refills: 3 | Status: SHIPPED | OUTPATIENT
Start: 2024-06-20

## 2024-06-20 NOTE — TELEPHONE ENCOUNTER
Rx Refill Note  Requested Prescriptions     Pending Prescriptions Disp Refills    atorvastatin (LIPITOR) 20 MG tablet 90 tablet 3     Sig: Take 1 tablet by mouth Every Evening.      Last office visit with prescribing clinician: Visit date not found   Last telemedicine visit with prescribing clinician: Visit date not found   Next office visit with prescribing clinician: Visit date not found       Ermelinda Taveras  06/20/24, 10:00 EDT

## 2024-09-17 RX ORDER — HYDROCHLOROTHIAZIDE 25 MG/1
TABLET ORAL
Qty: 90 TABLET | Refills: 1 | Status: SHIPPED | OUTPATIENT
Start: 2024-09-17

## 2024-09-18 RX ORDER — VALSARTAN 160 MG/1
TABLET ORAL
Qty: 90 TABLET | Refills: 3 | Status: SHIPPED | OUTPATIENT
Start: 2024-09-18

## 2024-11-07 ENCOUNTER — APPOINTMENT (OUTPATIENT)
Dept: GENERAL RADIOLOGY | Facility: HOSPITAL | Age: 64
End: 2024-11-07
Payer: COMMERCIAL

## 2024-11-07 ENCOUNTER — HOSPITAL ENCOUNTER (OUTPATIENT)
Facility: HOSPITAL | Age: 64
Setting detail: OBSERVATION
Discharge: HOME | End: 2024-11-08
Attending: EMERGENCY MEDICINE | Admitting: EMERGENCY MEDICINE
Payer: COMMERCIAL

## 2024-11-07 DIAGNOSIS — T14.8XXA AVULSION FRACTURE: ICD-10-CM

## 2024-11-07 DIAGNOSIS — S86.811A RUPTURE OF RIGHT PATELLAR TENDON, INITIAL ENCOUNTER: Primary | ICD-10-CM

## 2024-11-07 PROBLEM — M25.561 RIGHT KNEE PAIN: Status: ACTIVE | Noted: 2024-11-07

## 2024-11-07 LAB
ANION GAP SERPL CALCULATED.3IONS-SCNC: 8.1 MMOL/L (ref 5–15)
BASOPHILS # BLD AUTO: 0.06 10*3/MM3 (ref 0–0.2)
BASOPHILS NFR BLD AUTO: 0.7 % (ref 0–1.5)
BUN SERPL-MCNC: 17 MG/DL (ref 8–23)
BUN/CREAT SERPL: 17 (ref 7–25)
CALCIUM SPEC-SCNC: 9.8 MG/DL (ref 8.6–10.5)
CHLORIDE SERPL-SCNC: 101 MMOL/L (ref 98–107)
CO2 SERPL-SCNC: 27.9 MMOL/L (ref 22–29)
CREAT SERPL-MCNC: 1 MG/DL (ref 0.76–1.27)
DEPRECATED RDW RBC AUTO: 40.2 FL (ref 37–54)
EGFRCR SERPLBLD CKD-EPI 2021: 83 ML/MIN/1.73
EOSINOPHIL # BLD AUTO: 0.21 10*3/MM3 (ref 0–0.4)
EOSINOPHIL NFR BLD AUTO: 2.4 % (ref 0.3–6.2)
ERYTHROCYTE [DISTWIDTH] IN BLOOD BY AUTOMATED COUNT: 12.4 % (ref 12.3–15.4)
GLUCOSE SERPL-MCNC: 148 MG/DL (ref 65–99)
HCT VFR BLD AUTO: 39.5 % (ref 37.5–51)
HGB BLD-MCNC: 13.6 G/DL (ref 13–17.7)
IMM GRANULOCYTES # BLD AUTO: 0.02 10*3/MM3 (ref 0–0.05)
IMM GRANULOCYTES NFR BLD AUTO: 0.2 % (ref 0–0.5)
LYMPHOCYTES # BLD AUTO: 1.78 10*3/MM3 (ref 0.7–3.1)
LYMPHOCYTES NFR BLD AUTO: 20.1 % (ref 19.6–45.3)
MCH RBC QN AUTO: 31.4 PG (ref 26.6–33)
MCHC RBC AUTO-ENTMCNC: 34.4 G/DL (ref 31.5–35.7)
MCV RBC AUTO: 91.2 FL (ref 79–97)
MONOCYTES # BLD AUTO: 0.66 10*3/MM3 (ref 0.1–0.9)
MONOCYTES NFR BLD AUTO: 7.5 % (ref 5–12)
NEUTROPHILS NFR BLD AUTO: 6.12 10*3/MM3 (ref 1.7–7)
NEUTROPHILS NFR BLD AUTO: 69.1 % (ref 42.7–76)
NRBC BLD AUTO-RTO: 0 /100 WBC (ref 0–0.2)
PLATELET # BLD AUTO: 222 10*3/MM3 (ref 140–450)
PMV BLD AUTO: 11.5 FL (ref 6–12)
POTASSIUM SERPL-SCNC: 4.2 MMOL/L (ref 3.5–5.2)
RBC # BLD AUTO: 4.33 10*6/MM3 (ref 4.14–5.8)
SODIUM SERPL-SCNC: 137 MMOL/L (ref 136–145)
WBC NRBC COR # BLD AUTO: 8.85 10*3/MM3 (ref 3.4–10.8)

## 2024-11-07 PROCEDURE — 99285 EMERGENCY DEPT VISIT HI MDM: CPT

## 2024-11-07 PROCEDURE — 73560 X-RAY EXAM OF KNEE 1 OR 2: CPT

## 2024-11-07 PROCEDURE — 80048 BASIC METABOLIC PNL TOTAL CA: CPT | Performed by: EMERGENCY MEDICINE

## 2024-11-07 PROCEDURE — G0378 HOSPITAL OBSERVATION PER HR: HCPCS

## 2024-11-07 PROCEDURE — 85025 COMPLETE CBC W/AUTO DIFF WBC: CPT | Performed by: EMERGENCY MEDICINE

## 2024-11-07 RX ORDER — ACETAMINOPHEN 325 MG/1
650 TABLET ORAL EVERY 4 HOURS PRN
Status: DISCONTINUED | OUTPATIENT
Start: 2024-11-07 | End: 2024-11-08 | Stop reason: HOSPADM

## 2024-11-07 RX ORDER — ONDANSETRON 2 MG/ML
4 INJECTION INTRAMUSCULAR; INTRAVENOUS EVERY 6 HOURS PRN
Status: DISCONTINUED | OUTPATIENT
Start: 2024-11-07 | End: 2024-11-08 | Stop reason: HOSPADM

## 2024-11-07 RX ORDER — HYDROCODONE BITARTRATE AND ACETAMINOPHEN 5; 325 MG/1; MG/1
1 TABLET ORAL ONCE
Status: COMPLETED | OUTPATIENT
Start: 2024-11-07 | End: 2024-11-07

## 2024-11-07 RX ORDER — POLYETHYLENE GLYCOL 3350 17 G/17G
17 POWDER, FOR SOLUTION ORAL DAILY PRN
Status: DISCONTINUED | OUTPATIENT
Start: 2024-11-07 | End: 2024-11-08 | Stop reason: HOSPADM

## 2024-11-07 RX ORDER — SODIUM CHLORIDE 9 MG/ML
40 INJECTION, SOLUTION INTRAVENOUS AS NEEDED
Status: DISCONTINUED | OUTPATIENT
Start: 2024-11-07 | End: 2024-11-08 | Stop reason: HOSPADM

## 2024-11-07 RX ORDER — TRAMADOL HYDROCHLORIDE 50 MG/1
50 TABLET ORAL EVERY 8 HOURS PRN
Qty: 9 TABLET | Refills: 0 | Status: SHIPPED | OUTPATIENT
Start: 2024-11-07 | End: 2024-11-11

## 2024-11-07 RX ORDER — HYDROCODONE BITARTRATE AND ACETAMINOPHEN 5; 325 MG/1; MG/1
1 TABLET ORAL EVERY 6 HOURS PRN
Status: DISCONTINUED | OUTPATIENT
Start: 2024-11-07 | End: 2024-11-08 | Stop reason: HOSPADM

## 2024-11-07 RX ORDER — BISACODYL 10 MG
10 SUPPOSITORY, RECTAL RECTAL DAILY PRN
Status: DISCONTINUED | OUTPATIENT
Start: 2024-11-07 | End: 2024-11-08 | Stop reason: HOSPADM

## 2024-11-07 RX ORDER — AMOXICILLIN 250 MG
2 CAPSULE ORAL 2 TIMES DAILY PRN
Status: DISCONTINUED | OUTPATIENT
Start: 2024-11-07 | End: 2024-11-08 | Stop reason: HOSPADM

## 2024-11-07 RX ORDER — BISACODYL 5 MG/1
5 TABLET, DELAYED RELEASE ORAL DAILY PRN
Status: DISCONTINUED | OUTPATIENT
Start: 2024-11-07 | End: 2024-11-08 | Stop reason: HOSPADM

## 2024-11-07 RX ORDER — SODIUM CHLORIDE 0.9 % (FLUSH) 0.9 %
10 SYRINGE (ML) INJECTION AS NEEDED
Status: DISCONTINUED | OUTPATIENT
Start: 2024-11-07 | End: 2024-11-08 | Stop reason: HOSPADM

## 2024-11-07 RX ORDER — SODIUM CHLORIDE 0.9 % (FLUSH) 0.9 %
10 SYRINGE (ML) INJECTION EVERY 12 HOURS SCHEDULED
Status: DISCONTINUED | OUTPATIENT
Start: 2024-11-07 | End: 2024-11-08 | Stop reason: HOSPADM

## 2024-11-07 RX ADMIN — HYDROCODONE BITARTRATE AND ACETAMINOPHEN 1 TABLET: 5; 325 TABLET ORAL at 23:11

## 2024-11-08 ENCOUNTER — APPOINTMENT (OUTPATIENT)
Dept: MRI IMAGING | Facility: HOSPITAL | Age: 64
End: 2024-11-08
Payer: COMMERCIAL

## 2024-11-08 ENCOUNTER — READMISSION MANAGEMENT (OUTPATIENT)
Dept: CALL CENTER | Facility: HOSPITAL | Age: 64
End: 2024-11-08
Payer: COMMERCIAL

## 2024-11-08 VITALS
OXYGEN SATURATION: 100 % | WEIGHT: 222 LBS | HEART RATE: 59 BPM | BODY MASS INDEX: 29.42 KG/M2 | SYSTOLIC BLOOD PRESSURE: 122 MMHG | RESPIRATION RATE: 16 BRPM | HEIGHT: 73 IN | DIASTOLIC BLOOD PRESSURE: 74 MMHG | TEMPERATURE: 97.9 F

## 2024-11-08 LAB
ANION GAP SERPL CALCULATED.3IONS-SCNC: 7.3 MMOL/L (ref 5–15)
BUN SERPL-MCNC: 15 MG/DL (ref 8–23)
BUN/CREAT SERPL: 17 (ref 7–25)
CALCIUM SPEC-SCNC: 9.4 MG/DL (ref 8.6–10.5)
CHLORIDE SERPL-SCNC: 107 MMOL/L (ref 98–107)
CO2 SERPL-SCNC: 24.7 MMOL/L (ref 22–29)
CREAT SERPL-MCNC: 0.88 MG/DL (ref 0.76–1.27)
DEPRECATED RDW RBC AUTO: 42.7 FL (ref 37–54)
EGFRCR SERPLBLD CKD-EPI 2021: 94.8 ML/MIN/1.73
ERYTHROCYTE [DISTWIDTH] IN BLOOD BY AUTOMATED COUNT: 12.6 % (ref 12.3–15.4)
GLUCOSE SERPL-MCNC: 106 MG/DL (ref 65–99)
HCT VFR BLD AUTO: 38.7 % (ref 37.5–51)
HGB BLD-MCNC: 13.3 G/DL (ref 13–17.7)
MCH RBC QN AUTO: 32 PG (ref 26.6–33)
MCHC RBC AUTO-ENTMCNC: 34.4 G/DL (ref 31.5–35.7)
MCV RBC AUTO: 93 FL (ref 79–97)
PLATELET # BLD AUTO: 201 10*3/MM3 (ref 140–450)
PMV BLD AUTO: 11.5 FL (ref 6–12)
POTASSIUM SERPL-SCNC: 4.3 MMOL/L (ref 3.5–5.2)
RBC # BLD AUTO: 4.16 10*6/MM3 (ref 4.14–5.8)
SODIUM SERPL-SCNC: 139 MMOL/L (ref 136–145)
WBC NRBC COR # BLD AUTO: 8.93 10*3/MM3 (ref 3.4–10.8)

## 2024-11-08 PROCEDURE — G0378 HOSPITAL OBSERVATION PER HR: HCPCS

## 2024-11-08 PROCEDURE — 85027 COMPLETE CBC AUTOMATED: CPT | Performed by: PHYSICIAN ASSISTANT

## 2024-11-08 PROCEDURE — 97110 THERAPEUTIC EXERCISES: CPT

## 2024-11-08 PROCEDURE — 97162 PT EVAL MOD COMPLEX 30 MIN: CPT

## 2024-11-08 PROCEDURE — 80048 BASIC METABOLIC PNL TOTAL CA: CPT | Performed by: PHYSICIAN ASSISTANT

## 2024-11-08 PROCEDURE — 99204 OFFICE O/P NEW MOD 45 MIN: CPT | Performed by: ORTHOPAEDIC SURGERY

## 2024-11-08 PROCEDURE — 73721 MRI JNT OF LWR EXTRE W/O DYE: CPT

## 2024-11-08 RX ORDER — ATORVASTATIN CALCIUM 20 MG/1
20 TABLET, FILM COATED ORAL EVERY EVENING
Status: DISCONTINUED | OUTPATIENT
Start: 2024-11-09 | End: 2024-11-08 | Stop reason: HOSPADM

## 2024-11-08 RX ORDER — HYDROCHLOROTHIAZIDE 25 MG/1
25 TABLET ORAL DAILY
Status: DISCONTINUED | OUTPATIENT
Start: 2024-11-09 | End: 2024-11-08 | Stop reason: HOSPADM

## 2024-11-08 RX ORDER — VALSARTAN 160 MG/1
160 TABLET ORAL DAILY
Status: DISCONTINUED | OUTPATIENT
Start: 2024-11-09 | End: 2024-11-08 | Stop reason: HOSPADM

## 2024-11-08 RX ORDER — HYDROCODONE BITARTRATE AND ACETAMINOPHEN 5; 325 MG/1; MG/1
1 TABLET ORAL EVERY 6 HOURS PRN
Qty: 12 TABLET | Refills: 0 | Status: SHIPPED | OUTPATIENT
Start: 2024-11-08 | End: 2024-11-13 | Stop reason: HOSPADM

## 2024-11-08 RX ADMIN — Medication 10 ML: at 00:53

## 2024-11-08 RX ADMIN — Medication 10 ML: at 10:14

## 2024-11-08 RX ADMIN — HYDROCODONE BITARTRATE AND ACETAMINOPHEN 1 TABLET: 5; 325 TABLET ORAL at 10:13

## 2024-11-08 NOTE — PLAN OF CARE
Goal Outcome Evaluation:  Plan of Care Reviewed With: patient        Progress: no change  Outcome Evaluation: Pt being monitored on unit due to right knee pain after a fall. Pt has not requested any pain medication since arrival. A/Ox4. RA.. VSS. PT/OT consult. Orthopedic sonsult.       Problem: Adult Inpatient Plan of Care  Goal: Plan of Care Review  Outcome: Progressing  Flowsheets (Taken 11/8/2024 0435)  Progress: no change  Outcome Evaluation: Pt being monitored on unit due to right knee pain after a fall. Pt has not requested any pain medication since arrival. A/Ox4. RA.. VSS. PT/OT consult. Orthopedic sonsult.  Plan of Care Reviewed With: patient  Goal: Patient-Specific Goal (Individualized)  Outcome: Progressing  Goal: Absence of Hospital-Acquired Illness or Injury  Outcome: Progressing  Intervention: Identify and Manage Fall Risk  Recent Flowsheet Documentation  Taken 11/8/2024 0432 by Jenny Louis RN  Safety Promotion/Fall Prevention:   safety round/check completed   room organization consistent   nonskid shoes/slippers when out of bed   fall prevention program maintained   activity supervised   assistive device/personal items within reach  Taken 11/8/2024 0217 by Jenny Louis RN  Safety Promotion/Fall Prevention:   safety round/check completed   room organization consistent   nonskid shoes/slippers when out of bed   fall prevention program maintained   activity supervised   assistive device/personal items within reach  Taken 11/8/2024 0033 by Jenny Louis RN  Safety Promotion/Fall Prevention:   safety round/check completed   room organization consistent   nonskid shoes/slippers when out of bed   activity supervised   assistive device/personal items within reach  Intervention: Prevent Skin Injury  Recent Flowsheet Documentation  Taken 11/8/2024 0432 by Jenny Louis RN  Body Position: position changed independently  Taken 11/8/2024 0217 by Jenny Louis RN  Body Position: position changed  independently  Taken 11/8/2024 0033 by Jenny Louis RN  Body Position: position changed independently  Intervention: Prevent Infection  Recent Flowsheet Documentation  Taken 11/8/2024 0432 by Jenny Louis RN  Infection Prevention:   single patient room provided   rest/sleep promoted   hand hygiene promoted  Taken 11/8/2024 0217 by Jenny Louis RN  Infection Prevention:   single patient room provided   rest/sleep promoted   hand hygiene promoted  Taken 11/8/2024 0033 by Jenny Louis RN  Infection Prevention:   single patient room provided   rest/sleep promoted   hand hygiene promoted  Goal: Optimal Comfort and Wellbeing  Outcome: Progressing  Intervention: Monitor Pain and Promote Comfort  Recent Flowsheet Documentation  Taken 11/8/2024 0033 by Jenny Louis RN  Pain Management Interventions: pain management plan reviewed with patient/caregiver  Intervention: Provide Person-Centered Care  Recent Flowsheet Documentation  Taken 11/8/2024 0033 by Jenny Louis RN  Trust Relationship/Rapport: care explained  Goal: Readiness for Transition of Care  Outcome: Progressing  Intervention: Mutually Develop Transition Plan  Recent Flowsheet Documentation  Taken 11/8/2024 0036 by Jenny Louis RN  Transportation Anticipated: health plan transportation  Patient/Family Anticipated Services at Transition: none  Patient/Family Anticipates Transition to: home with family  Taken 11/8/2024 0035 by Jenny Louis RN  Equipment Currently Used at Home: none

## 2024-11-08 NOTE — THERAPY EVALUATION
Patient Name: David Robles  : 1960    MRN: [id number]                              Today's Date: 2024       Admit Date: 2024    Visit Dx:     ICD-10-CM ICD-9-CM   1. Rupture of right patellar tendon, initial encounter  S86.811A 844.8   2. Avulsion fracture  T14.8XXA 829.0     Patient Active Problem List   Diagnosis    Primary hypertension    Mixed hyperlipidemia    Hyperglycemia    History of CVA (cerebrovascular accident)    Paroxysmal atrial fibrillation    Chronic anticoagulation    Right knee pain     Past Medical History:   Diagnosis Date    Dizziness and giddiness     Essential (primary) hypertension     Hyperglycemia     Hyperlipidemia     Hypertension     Insect bite     Mixed hyperlipidemia     Other seborrheic keratosis     Stroke     2023    Unspecified skin changes      Past Surgical History:   Procedure Laterality Date    HAND SURGERY Right     HAND SURGERY      HERNIA REPAIR      HERNIA REPAIR  1974    again in       General Information       Row Name 24 1038          Physical Therapy Time and Intention    Document Type evaluation  -AR     Mode of Treatment physical therapy  -AR       Row Name 24 1038          General Information    Patient Profile Reviewed yes  -AR     Prior Level of Function independent:  no AD, no other falls, working  -AR     Existing Precautions/Restrictions fall  WBAT R LE, w/ knee immobilizer  -AR     Barriers to Rehab none identified  -AR       Row Name 24 1038          Living Environment    People in Home spouse  -AR       Row Name 24 1038          Home Main Entrance    Number of Stairs, Main Entrance two  -AR     Stair Railings, Main Entrance none  -AR       Row Name 24 1038          Stairs Within Home, Primary    Stairs, Within Home, Primary bedroom upstairs  -AR     Number of Stairs, Within Home, Primary twelve  -AR       Row Name 24 1038          Cognition    Orientation Status (Cognition) oriented x 4  -AR        Row Name 11/08/24 1038          Safety Issues/Impairments Affecting Functional Mobility    Impairments Affecting Function (Mobility) balance;range of motion (ROM);pain  -AR               User Key  (r) = Recorded By, (t) = Taken By, (c) = Cosigned By      Initials Name Provider Type    AR Lilia Altamirano, PT Physical Therapist                   Mobility       Row Name 11/08/24 1040          Bed Mobility    Bed Mobility supine-sit;sit-supine  -AR     Supine-Sit [place] (Bed Mobility) standby assist  -AR     Sit-Supine [place] (Bed Mobility) standby assist  -AR     Comment, (Bed Mobility) cues for how to move his own R LE  -AR       Row Name 11/08/24 1040          Transfers    Comment, (Transfers) CGA from bed and WC, using RW and crutches from both  -AR       Row Name 11/08/24 1040          Sit-Stand Transfer    Sit-Stand [place] (Transfers) contact guard  -AR     Assistive Device (Sit-Stand Transfers) walker, front-wheeled;crutches, axillary  -AR       Row Name 11/08/24 1040          Gait/Stairs (Locomotion)    [place] Level (Gait) contact guard;minimum assist (75% patient effort)  -AR     Assistive Device (Gait) walker, front-wheeled;crutches, axillary  -AR     Distance in Feet (Gait) 50  10' w/ crutches and min A + 50' w/ RW and CGA to SBA  -AR     Deviations/Abnormal Patterns (Gait) antalgic  -AR     Right Sided Gait Deviations heel strike decreased;weight shift ability decreased  no knee flexion  -AR     [place] Level (Stairs) contact guard  -AR     Assistive Device (Stairs) crutches, axillary  -AR     Handrail Location (Stairs) none  -AR     Number of Steps (Stairs) 3  -AR     Ascending Technique (Stairs) step-to-step  -AR     Descending Technique (Stairs) step-to-step  -AR               User Key  (r) = Recorded By, (t) = Taken By, (c) = Cosigned By      Initials Name Provider Type    Lilia Smiley, PT Physical Therapist                   Obj/Interventions       Row Name  11/08/24 1041          Range of Motion Comprehensive    Comment, General Range of Motion WFL except R knee  -AR       Row Name 11/08/24 1041          Strength Comprehensive (MMT)    Comment, General Manual Muscle Testing (MMT) Assessment WFL except R quad  -AR       Row Name 11/08/24 1041          Balance    Balance Assessment standing dynamic balance  -AR     Dynamic Standing Balance contact guard  -AR     Position/Device Used, Standing Balance walker, rolling  -AR               User Key  (r) = Recorded By, (t) = Taken By, (c) = Cosigned By      Initials Name Provider Type    Lilia Smiley, PT Physical Therapist                   Goals/Plan    No documentation.                  Clinical Impression       Row Name 11/08/24 1041          Pain    Pretreatment Pain Rating 3/10  -AR     Posttreatment Pain Rating 4/10  -AR     Pain Location knee  -AR     Pain Side/Orientation right  -AR     Response to Pain Interventions activity participation with tolerable pain  -AR       Row Name 11/08/24 1041          Plan of Care Review    Plan of Care Reviewed With patient  -AR     Outcome Evaluation Pt admitted after fall, tripping on boxes getting ready to move to [place]. +complete quad tendon rupture with restraction and avulsion fx.  WBAT R LE w/ KI on.  Plan for discharge home before surgery.   Pt normally independent, no AD, +works.  Pt able to ambulate 10' w/ crutches and min A + 50' w/ RW and CGA to SBA.  Improved gait and balalnce using rolling walker.  Able to ascend/descend steps w/ crutches and CGA, cues for sequencing.  Recommend DC to home with rolling walker, has crutches already delivered.  -AR       Row Name 11/08/24 1041          Therapy Assessment/Plan (PT)    Criteria for Skilled Interventions Met (PT) no  -AR     Therapy Frequency (PT) evaluation only  -AR       Row Name 11/08/24 1041          Vital Signs    O2 Delivery Pre Treatment room air  -AR       Row Name 11/08/24 1041          Positioning and  Restraints    Pre-Treatment Position in bed  no alarm  -AR     Post Treatment Position bed  -AR     In Bed notified nsg;call light within reach;encouraged to call for assist;with other staff  -AR               User Key  (r) = Recorded By, (t) = Taken By, (c) = Cosigned By      Initials Name Provider Type    Lilia Smiley, PT Physical Therapist                   Outcome Measures       Row Name 11/08/24 1046 11/08/24 0800       How much help from another person do you currently need...    Turning from your back to your side while in flat bed without using bedrails? 4  -AR 4  -RM    Moving from lying on back to sitting on the side of a flat bed without bedrails? 4  -AR 4  -RM    Moving to and from a bed to a chair (including a wheelchair)? 3  -AR 3  -RM    Standing up from a chair using your arms (e.g., wheelchair, bedside chair)? 3  -AR 2  -RM    Climbing 3-5 steps with a railing? 3  -AR 2  -RM    To walk in hospital room? 3  -AR 2  -RM    AM-PAC 6 Clicks Score (PT) 20  -AR 17  -RM    Highest Level of Mobility Goal 6 --> Walk 10 steps or more  -AR 5 --> Static standing  -RM      Row Name 11/08/24 0034 11/08/24 0033       How much help from another person do you currently need...    Turning from your back to your side while in flat bed without using bedrails? 4  -TF 4  -TF    Moving from lying on back to sitting on the side of a flat bed without bedrails? 4  -TF 4  -TF    Moving to and from a bed to a chair (including a wheelchair)? 3  -TF 3  -TF    Standing up from a chair using your arms (e.g., wheelchair, bedside chair)? 2  -TF 2  -TF    Climbing 3-5 steps with a railing? 2  -TF 2  -TF    To walk in hospital room? 2  -TF 2  -TF    AM-PAC 6 Clicks Score (PT) 17  -TF 17  -TF    Highest Level of Mobility Goal 5 --> Static standing  -TF 5 --> Static standing  -TF      Row Name 11/08/24 1046          Functional Assessment    Outcome Measure Options AM-PAC 6 Clicks Basic Mobility (PT)  -AR               User Key   (r) = Recorded By, (t) = Taken By, (c) = Cosigned By      Initials Name Provider Type    AR Lilia Altamirano, PT Physical Therapist    Cecil Major, RN Registered Nurse    Jenny Khan RN Registered Nurse                                 Physical Therapy Education       Title: PT OT SLP Therapies (Done)       Topic: Physical Therapy (Done)       Point: Mobility training (Done)       Learning Progress Summary            Patient Acceptance, E, VU by AR at 11/8/2024 1046                      Point: Home exercise program (Done)       Learning Progress Summary            Patient Acceptance, E, VU by AR at 11/8/2024 1046                      Point: Body mechanics (Done)       Learning Progress Summary            Patient Acceptance, E, VU by AR at 11/8/2024 1046                      Point: Precautions (Done)       Learning Progress Summary            Patient Acceptance, E, VU by AR at 11/8/2024 1046                                      User Key       Initials Effective Dates Name Provider Type Discipline    AR 06/16/21 -  Lilia Altamirano, PT Physical Therapist PT                  PT Recommendation and Plan     Outcome Evaluation: Pt admitted after fall, tripping on boxes getting ready to move to [place]. +complete quad tendon rupture with restraction and avulsion fx.  WBAT R LE w/ KI on.  Plan for discharge home before surgery.   Pt normally independent, no AD, +works.  Pt able to ambulate 10' w/ crutches and min A + 50' w/ RW and CGA to SBA.  Improved gait and balalnce using rolling walker.  Able to ascend/descend steps w/ crutches and CGA, cues for sequencing.  Recommend DC to home with rolling walker, has crutches already delivered.     Time Calculation:         PT Charges       Row Name 11/08/24 1038             Time Calculation    Start Time 0930  -AR      Stop Time 1005  -AR      Time Calculation (min) 35 min  -AR      PT Received On 11/08/24  -AR                User Key  (r) = Recorded By, (t) = Taken By, (c)  = Cosigned By      Initials Name Provider Type    AR Lilia Altamirano, PT Physical Therapist                  Therapy Charges for Today       Code Description Service Date Service Provider Modifiers Qty    44768888526 HC PT EVAL MOD COMPLEXITY 3 11/8/2024 Lilia Altamirano, PT GP 1    13550619483 HC PT THER PROC EA 15 MIN 11/8/2024 Lilia Altamirano, PT GP 1            PT G-Codes  Outcome Measure Options: AM-PAC 6 Clicks Basic Mobility (PT)  AM-PAC 6 Clicks Score (PT): 20  PT Discharge Summary  Anticipated Discharge Disposition (PT): home with assist    Lilia Altamirano PT  11/8/2024

## 2024-11-08 NOTE — SIGNIFICANT NOTE
11/08/24 0833   OTHER   Discipline occupational therapist   Rehab Time/Intention   Session Not Performed other (see comments)  (Pt with complete quad tendon rupture with retraction. Will follow up after ortho consult/plan)

## 2024-11-08 NOTE — DISCHARGE INSTRUCTIONS
Patient with right quad tendon rupture thus affecting his extensor mechanism.  Surgical intervention is warranted.  If he does want surgery here we are happy to do that however we would request that he stays here for at least minimum 4 to 6 weeks to ensure proper wound healing and initiation of some therapy as well as he would be at increased risk for possible DVT with traveling close to surgery.  He is on Xarelto need to stop at least 48 hours prior to surgery.  If he does want it done here we will also need to arrange therapy postoperatively and orthopedic follow-up at his new residence in [facility].  Patient will get back to us later today or by Monday morning.  We could potentially do this next week.  We will get a T scope brace for him to be fitted and be locked in extension he is to have it on while up at all times and be protected weightbearing to the right lower extremity use of a walking aid such as a walker or crutches.  Recommend icing and elevating.  Pain control.  Continue ankle pumps.  When at rest may take brace off to check skin then put it back on but always to maintain extension.  Recommend sleeping in the brace.

## 2024-11-08 NOTE — DISCHARGE SUMMARY
ED OBSERVATION PROGRESS/DISCHARGE SUMMARY    Date of Admission: 11/7/2024   LOS: 0 days   PCP: Alma Bernal MD    Final Diagnosis: Avulsion fracture of right patella  Suspect rupture of right patellar tendon, quadra      Subjective     Hospital Outcome:     David Robles is a 64 y.o. male who comes in complaining of fall and right knee pain.  Patient states that he was lifting a box when his right leg gave out from underneath him.  Patient states that this caused him to trip and fall onto his knees.  Patient states his right leg was bent underneath him.  Patient denies hitting his head or any syncopal episode or any loss of consciousness.  Patient states his pain currently is minimal at rest but is excruciating pain upon weightbearing.  Patient denies any recent illness.  Patient denies any new or worsening numbness tingling of right lower extremity or into the foot or toes.  Patient states he is on Xarelto for history of A-fib.  Orthopedic surgery was consulted in ED and recommended further evaluation of MRI after x-ray was obtained.        In the ED, x-ray right knee shows suspect quadriceps tendon insertion tear/avulsion with retraction with small avulsion fragment 2 cm and soft tissue swelling.  MRI is recommended for further evaluation.  Patient is afebrile, pulse in the 50s, on room air oxygen and 99% SpO2 on blood pressure 140s over 70s.  Patient was given G[place] in ED.    11/8/2024: Patient seen and examined, resting in bed, no distress. Reports pain very minimal when lying still in bed. Pain primarily with movement. Reports seen by ortho this morning. Remains in knee immobilizer       ROS:  General: no fevers, chills  Respiratory: no cough, dyspnea  Cardiovascular: no chest pain, palpitations  Abdomen: No abdominal pain, nausea, vomiting, or diarrhea  Neurologic: No focal weakness    Objective   Physical Exam:  I have reviewed the vital signs.  Temp:  [97.3 °F (36.3 °C)-98.2 °F (36.8 °C)] 97.9 °F  (36.6 °C)  Heart Rate:  [53-59] 59  Resp:  [16-18] 16  BP: (117-163)/(58-90) 122/74  General Appearance:   Alert, cooperative, no distress  Head:    Normocephalic, atraumatic  Eyes:    Sclerae anicteric  Neck:   Supple, no mass  Lungs: Clear to auscultation bilaterally, respirations unlabored  Heart: Regular rate and rhythm, S1 and S2 normal, no murmur, rub or gallop  Abdomen:  Soft, nontender, bowel sounds active, nondistended  Extremities: No clubbing, cyanosis, or edema to lower extremities; R knee immobilizer in place  Pulses:  2+ and symmetric in distal lower extremities  Skin: No rashes   Neurologic: Oriented x3, Normal strength to extremities    Results Review:    I have reviewed the labs, radiology results and diagnostic studies.    Results from last 7 days   Lab Units 11/08/24  0450   WBC 10*3/mm3 8.93   HEMOGLOBIN g/dL 13.3   HEMATOCRIT % 38.7   PLATELETS 10*3/mm3 201     Results from last 7 days   Lab Units 11/08/24  0450 11/07/24  2320   SODIUM mmol/L 139 137   POTASSIUM mmol/L 4.3 4.2   CHLORIDE mmol/L 107 101   CO2 mmol/L 24.7 27.9   BUN mg/dL 15 17   CREATININE mg/dL 0.88 1.00   CALCIUM mg/dL 9.4 9.8   GLUCOSE mg/dL 106* 148*     Imaging Results (Last 24 Hours)       Procedure Component Value Units Date/Time    MRI Knee Right Without Contrast [[id number]] Collected: 11/08/24 0724     Updated: 11/08/24 0801    Narrative:      MRI RIGHT KNEE WITHOUT CONTRAST     HISTORY: Right knee injury with suspected quadriceps insertional  tear/avulsion.     TECHNIQUE:  MRI right knee includes axial and coronal PD fat-sat as well  as sagittal PD, T1, T2-weighted sequences.     COMPARISON: Right knee x-rays 11/07/2024.      FINDINGS: There is a complete quadriceps insertional tear/avulsion.  Posterior quadriceps tendon fibers are retracted up to 4 cm and anterior  quadriceps tendon fibers are retracted 2 cm. As demonstrated  radiographically, there is a thin linear 2 x 1 cm avulsed fragment  associated with the  quadriceps tendon end 2 cm above the patella. There  is quadriceps retraction and there is surrounding soft tissue edema.  Knee joint effusion is present and there is extracapsular extension of  fluid through the tear. There is also patella baja and the patellar  tendon exhibits undulating configuration.     There is a small tear along the undersurface of the free edge of the  posterior horn lateral meniscus demonstrated on the sagittal PD weighted  sequence image 13. The medial meniscus is intact. Cruciate ligaments,  medial and lateral collateral ligament complexes are intact. Mild  patellofemoral cartilage thinning. Bone marrow signal within normal  limits. No focal medial or lateral compartment articular cartilage  defect is demonstrated.       Impression:      1. Complete quadriceps tendon insertional tear/avulsion with retraction  and surrounding edema/hemorrhage. Joint effusion with extracapsular  extension of fluid.  2. Small free edge tear along the undersurface of the posterior horn  lateral meniscus.  3. Mild patellofemoral arthritis.     This report was finalized on 11/8/2024 7:58 AM by Bud Salgado M.D  on Workstation: [id number]       XR Knee 1 or 2 View Right [[id number]] Collected: 11/07/24 2149     Updated: 11/08/24 0752    Narrative:      XR KNEE 1 OR 2 VW RIGHT-     HISTORY: fall, right knee pain and swelling, likely patellar tendon  rupture     COMPARISON: None     FINDINGS: There is a 1 cm linear calcification 2 cm above the patella  that is suspected to represent a quadriceps tendon insertional avulsion  fragment associated with quadriceps insertional tear/avulsion. There is  overlying soft tissue swelling with effusion. Patella is somewhat low  lying. Medial and lateral compartment joint spaces are preserved.       Impression:      Suspect quadriceps tendon insertional tear/avulsion with 2  cm retraction of a small avulsion fragment 2 cm and soft tissue  swelling. Further evaluation with  MRI is recommended.     This report was finalized on 11/8/2024 7:48 AM by Bud Salgado M.D  on Workstation: [id number]               I have reviewed the medications.     Discharge Medications        New Medications        Instructions Start Date   traMADol 50 MG tablet  Commonly known as: ULTRAM   50 mg, Oral, Every 8 Hours PRN             Continue These Medications        Instructions Start Date   atorvastatin 20 MG tablet  Commonly known as: LIPITOR   20 mg, Oral, Every Evening      fish oil 1000 MG capsule capsule   3,000 mg, 2 Times Daily With Meals      hydroCHLOROthiazide 25 MG tablet   TAKE 1 TABLET BY MOUTH EVERY DAY      multivitamin tablet tablet   1 tablet, Daily      valsartan 160 MG tablet  Commonly known as: DIOVAN   TAKE 1 TABLET BY MOUTH EVERY DAY      Xarelto 20 MG tablet  Generic drug: rivaroxaban   20 mg, Daily With Dinner              ---------------------------------------------------------------------------------------------  Assessment & Plan   Assessment/Problem List    Right knee pain      Plan:    Avulsion fracture of right patella  Suspect rupture of right patellar tendon  -x-ray right knee shows suspect quadriceps tendon insertion tear/avulsion with retraction with small avulsion fragment 2 cm and soft tissue swelling.    - MRI with complete quadriceps tendon insertional tear/avulsion with retraction and surrounding edema/hemorrhage. Joint effusion with extracapsular extension of fluid. Small free edge tear along the undersurface of posterior horn lateral meniscus. Mild patellofemoral arthritis.   - Patient is afebrile, pulse in the 50s, on room air oxygen and 99% SpO2 on blood pressure 140s over 70s.  BP trends reviewed and acceptable. HR 59  -Patient was given Norco in ED.  -Right knee immobilizer in place in ED, per ortho adjusting to t-scope  -Orthopedic surgery input noted. Surgical intervention warranted. Patient in the process of moving to [place], closing on the house.  "Considering having surgery in [place] vs here. Ortho notes 4-6 weeks to stay after surgery to ensure proper wound healing, also notes increase r/o DVT with travelling close to surgery. Will need to stop Xarelto 48 hours prior to surgery   Plans for \"T scope brace for him to be fitted and be locked in extension he is to have it on while up at all times and be protected weightbearing to the right lower extremity use of a walking aid such as a walker or crutches. Recommend icing and elevating. Pain control. Continue ankle pumps. When at rest may take brace off to check skin then put it back on but always to maintain extension. Recommend sleeping in the brace.\"  -PT OT consult, walker, crutches   -MRI without contrast of right knee noting complete quadriceps tendon insertional tear/avulsion with retraction and surrounding edema/hemorrhage. Joint effusion with extracapsular extension of fluid. Small free edge tear along the undersurface of the posterior horn lateral meniscus. Mild patellofemoral arthritis.   -Pain control  -CBC and BMP reviewed. Hg stable 13.3. BMP unremarkable.   -Heart healthy diet  - will give patient copy of MRI/CT to have in case he decides to proceed with surgery in [place]  - will order rolling walker per PT recommendations     History of A-fib  -Continue home Xarelto, (hold 48 hours prior to surgery discussed with patient)     Essential hypertension, chronic, poorly controlled  -Continue home hydrochlorothiazide, valsartan     Hyperlipidemia  -lipid panel, continue home statin    Disposition: Home    Follow-up after Discharge: Ortho in one week, PCP as needed    This note will serve as a discharge summary    Prudence Longo, APRN 11/08/24 08:38 EST    I have worn appropriate PPE during this patient encounter, sanitized my hands both with entering and exiting patient's room.      38 minutes has been spent by [facility] providers in the care of this patient while under " observation status

## 2024-11-08 NOTE — PROGRESS NOTES
CÉSAR RAMIREZ ATTESTATION NOTE    SHARED VISIT: This visit was performed by BOTH a physician and an APC. The substantive portion of the medical decision making was performed by this attesting physician who made or approved the management plan and takes responsibility for patient management. All studies in the APC note (if performed) were independently interpreted by me.     The SUJATA and I have discussed this patient's history, physical exam, and treatment plan.  I have reviewed the documentation and personally had a face to face interaction with the patient. I provided a substantive portion of the care of the patient.  I affirm the documentation and agree with the treatment and plan.  The note below describes my personal findings:         S: pleasant cooperative M sitting up in bed eating breakfast with no new complaints. Pt used norco at 10pm with no other doses overnight or this am yet.  Pt has not been up out of bed at all.     O:  Exam  General : pleasant cooperative and conversant M  HEENT: NCAT, eomi, no scleral icterus  Resp: relaxed breathing  Exts: RLE in knee immobilizer, right foot without edema and able to move toes  Neuro: alert and appropriate, face symmetric, nl speech,    Diagnostic tests: MRI knee  IMPRESSION:  1. Complete quadriceps tendon insertional tear/avulsion with retraction  and surrounding edema/hemorrhage. Joint effusion with extracapsular  extension of fluid.  2. Small free edge tear along the undersurface of the posterior horn  lateral meniscus.  3. Mild patellofemoral arthritis     Assessment and Plan: 63 yo m with h/o afib on xarelto, HTN, and HLD admitted to obs unit for right knee injury sustained after a fall. Pt with right quad tendon avulsion.  Awaiting ortho recs--pt says he did see ortho this am.  Await pt and ot.  Likely home later today unless there is an urgent surgical plan which patient understood would occur at a later date.

## 2024-11-08 NOTE — H&P
[facility]   HISTORY AND PHYSICAL    Patient Name: David Robles  : 1960  MRN: [id number]  Primary Care Physician:  Alma Bernal MD  Date of admission: 2024    Subjective   Subjective     Chief Complaint:   Chief Complaint   Patient presents with    Fall    Knee Pain         HPI:    David Robles is a 64 y.o. male who comes in complaining of fall and right knee pain.  Patient states that he was lifting a box when his right leg gave out from underneath him.  Patient states that this caused him to trip and fall onto his knees.  Patient states his right leg was bent underneath him.  Patient denies hitting his head or any syncopal episode or any loss of consciousness.  Patient states his pain currently is minimal at rest but is excruciating pain upon weightbearing.  Patient denies any recent illness.  Patient denies any new or worsening numbness tingling of right lower extremity or into the foot or toes.  Patient states he is on Xarelto for history of A-fib.  Orthopedic surgery was consulted in ED and recommended further evaluation of MRI after x-ray was obtained.      In the ED, x-ray right knee shows suspect quadriceps tendon insertion tear/avulsion with retraction with small avulsion fragment 2 cm and soft tissue swelling.  MRI is recommended for further evaluation.  Patient is afebrile, pulse in the 50s, on room air oxygen and 99% SpO2 on blood pressure 140s over 70s.  Patient was given Jerome in ED.    Review of Systems   All systems were reviewed and negative except for: as per HPI    Personal History     Past Medical History:   Diagnosis Date    Dizziness and giddiness     Essential (primary) hypertension     Hyperglycemia     Hyperlipidemia     Hypertension     Insect bite     Mixed hyperlipidemia     Other seborrheic keratosis     Stroke     2023    Unspecified skin changes        Past Surgical History:   Procedure Laterality Date    HAND SURGERY Right     HAND SURGERY       HERNIA REPAIR      HERNIA REPAIR  1974    again in 1995       Family History: family history includes Coronary artery disease in his father; Diabetes in his brother and another family member; Drug abuse in his son; Hyperlipidemia in his daughter; Hypertension in his son; Leukemia in his sister; Lymphoma in his mother and sister; Stroke in his paternal grandmother and another family member; Throat cancer in his father. Otherwise pertinent FHx was reviewed and not pertinent to current issue.    Social History:  reports that he has never smoked. He has never used smokeless tobacco. He reports that he does not drink alcohol and does not use drugs.    Home Medications:  atorvastatin, fish oil, hydroCHLOROthiazide, multivitamin, rivaroxaban, traMADol, and valsartan    Allergies:  No Known Allergies    Objective   Objective     Vitals:   Temp:  [97.3 °F (36.3 °C)] 97.3 °F (36.3 °C)  Heart Rate:  [56] 56  Resp:  [18] 18  BP: (142-148)/(58-79) 148/79  Physical Exam    Constitutional: Awake, alert   Eyes: PERRLA, sclerae anicteric, no conjunctival injection   HENT: NCAT, mucous membranes moist   Neck: Supple, no thyromegaly, no lymphadenopathy, trachea midline   Respiratory: Clear to auscultation bilaterally, nonlabored respirations    Cardiovascular: RRR, no murmurs, rubs, or gallops, palpable pedal pulses bilaterally   Gastrointestinal: Positive bowel sounds, soft, nontender, nondistended   Musculoskeletal: No bilateral ankle edema, no clubbing or cyanosis to extremities. Right knee pain swelling present.  Right knee immobilizer in place   Psychiatric: Appropriate affect, cooperative   Neurologic: Oriented x 3, strength symmetric in all extremities, Cranial Nerves grossly intact to confrontation, speech clear   Skin: No rashes     Result Review    Result Review:  I have personally reviewed the results from the time of this admission to 11/7/2024 23:48 EST and agree with these findings:  []  Laboratory list / accordion  []   Microbiology  [x]  Radiology  []  EKG/Telemetry   []  Cardiology/Vascular   []  Pathology  []  Old records  []  Other:  Most notable findings include: see above      Assessment & Plan   Assessment / Plan     Brief Patient Summary:  David Robles is a 64 y.o. male who comes in complaining of fall and right knee pain    Active Hospital Problems:  Active Hospital Problems    Diagnosis     **Right knee pain      Plan:     Avulsion fracture of right patella  Suspect rupture of right patellar tendon  -x-ray right knee shows suspect quadriceps tendon insertion tear/avulsion with retraction with small avulsion fragment 2 cm and soft tissue swelling.  MRI is recommended for further evaluation.   - Patient is afebrile, pulse in the 50s, on room air oxygen and 99% SpO2 on blood pressure 140s over 70s.    -Patient was given Norco in ED.  -Right knee immobilizer in place in ED  -Orthopedic surgery  -PT OT consult  -MRI without contrast of right knee   -Pain control  -CBC and BMP pending.  -Heart healthy diet    History of A-fib  -Continue home Xarelto    Essential hypertension, chronic, poorly controlled  -Continue home hydrochlorothiazide, valsartan    Hyperlipidemia  -Check lipid panel, continue home statin          VTE Prophylaxis: [facility]  Mechanical VTE prophylaxis orders are present.        CODE STATUS:    Code Status (Patient has no pulse and is not breathing): CPR (Attempt to Resuscitate)  Medical Interventions (Patient has pulse or is breathing): Full Support    Admission Status:  I believe this patient meets observation status.    78 minutes have been spent by [facility] providers in the care of this patient while under observation status.      Appropriate PPE worn during patient encounter.  Hand hygeine performed before and after seeing the patient.      Electronically signed by JESUS Suarez, 11/07/24, 11:13 PM EST.

## 2024-11-08 NOTE — SIGNIFICANT NOTE
11/08/24 0832   OTHER   Discipline occupational therapist   Rehab Time/Intention   Session Not Performed other (see comments)  (Pt with complete quad tendon rupture with retraction. Will follow up after ortho consult/plan)   Recommendation   OT - Next Appointment 11/09/24

## 2024-11-08 NOTE — DISCHARGE PLACEMENT REQUEST
"David Robles (64 y.o. Male)       Date of Birth   1960    Social Security Number       Address   [address] [place], [place] [address]    Home Phone   [phone]    MRN   [id number]       Scientologist   Unknown    Marital Status                               Admission Date   11/7/24    Admission Type   Emergency    Admitting Provider   Codey Arnold MD    Attending Provider   Zainab Vallejo MD    Department, Room/Bed   [facility] OBSERVATION, 120/1       Discharge Date       Discharge Disposition       Discharge Destination                                 Attending Provider: Zainab Vallejo MD    Allergies: No Known Allergies    Isolation: None   Infection: None   Code Status: CPR    Ht: 185.4 cm (73\")   Wt: 101 kg (222 lb)    Admission Cmt: None   Principal Problem: Right knee pain [M25.561]                   Active Insurance as of 11/7/2024       Primary Coverage       Payor Plan Insurance Group Employer/Plan Group    [employer] BLUE CROSS [employer] BLUE CROSS BLUE SHIELD PPO [id number]       Payor Plan Address Payor Plan Phone Number Payor Plan Fax Number Effective Dates    [address] [phone]  1/1/2021 - None Entered    [facility] [id number]         Subscriber Name Subscriber Birth Date Member ID       DAVID ROBLES 1960 [id number]                     Emergency Contacts        (Rel.) Home Phone Work Phone Mobile Phone    BERTHANANCY (Spouse) -- -- [phone]                "

## 2024-11-08 NOTE — PROGRESS NOTES
Discharge Planning Assessment  [facility]     Patient Name: David Robles  MRN: [id number]  Today's Date: 11/8/2024    Admit Date: 11/7/2024    Plan: Return home with a T-scope brace from [employer] and a rolling walker   Discharge Needs Assessment       Row Name 11/08/24 1120       Living Environment    People in Home spouse    Current Living Arrangements home    Primary Care Provided by self    Family Caregiver if Needed spouse    Family Caregiver Names Elizabeth Robles spouse [phone]    Quality of Family Relationships helpful;involved;supportive    Able to Return to Prior Arrangements yes       Resource/Environmental Concerns    Resource/Environmental Concerns none    Transportation Concerns none       Transition Planning    Patient/Family Anticipates Transition to home with family       Discharge Needs Assessment    Equipment Currently Used at Home none    Concerns to be Addressed no discharge needs identified    Equipment Needed After Discharge walker, rolling    Provided Post Acute Provider List? Yes    Post Acute Provider List DME Supplier    Provided Post Acute Provider Quality & Resource List? Yes    Delivered To Patient    Method of Delivery In person    Offered/Gave Vendor List yes                   Discharge Plan       Row Name 11/08/24 1132       Plan    Plan Return home with a T-scope brace from [employer] and a rolling walker    Patient/Family in Agreement with Plan yes    Plan Comments Spoke with patient at bedside face sheet verified. Patient lives with his spouse Elizabeth and is independent with ADL's previous to admission. He needs a T-scope brace and a rolling walker that will be delivered to his bedside before he is discharged. Call placed to [facility] she stated she is on her way to [facility] with the T-scope. [facility] will deliver the rolling walker to bedside. Patient will return home with spouse she will provide transportation. Maicol PABLO                  Continued Care and  Services - Admitted Since 11/7/2024       Durable Medical Equipment       Service Provider Request Status Services Address Phone Fax Patient Preferred    [facility] Accepted -- [address] , [facility] [address] [phone] --                  Expected Discharge Date and Time       Expected Discharge Date Expected Discharge Time    Nov 8, 2024            Demographic Summary       Row Name 11/08/24 1116       General Information    Admission Type observation    Arrived From emergency department    Referral Source admission list    Reason for Consult discharge planning    Preferred Language English                   Functional Status       Row Name 11/08/24 1117       Functional Status    Usual Activity Tolerance good    Current Activity Tolerance good       Functional Status, IADL    Medications independent    Meal Preparation independent    Housekeeping assistive person    Laundry assistive person    Shopping assistive person                   Psychosocial    No documentation.                  Abuse/Neglect    No documentation.                  Legal    No documentation.                  Substance Abuse    No documentation.                  Patient Forms    No documentation.                     Nancy Sands RN

## 2024-11-08 NOTE — SIGNIFICANT NOTE
11/08/24 0846   OTHER   Discipline physical therapist   Rehab Time/Intention   Session Not Performed   (MRI showing complete quad tendon rupture w/ retraction.  Plan to attempt PT eval after otho consult/plan.)

## 2024-11-08 NOTE — PROGRESS NOTES
Discharge Planning Assessment  [facility]     Patient Name: David Robles  MRN: [id number]  Today's Date: 11/8/2024    Admit Date: 11/7/2024    Plan: Home   Discharge Needs Assessment       Row Name 11/08/24 1120       Living Environment    People in Home spouse    Current Living Arrangements home    Primary Care Provided by self    Family Caregiver if Needed spouse    Family Caregiver Names Elizabeth Robles spouse [phone]    Quality of Family Relationships helpful;involved;supportive    Able to Return to Prior Arrangements yes       Resource/Environmental Concerns    Resource/Environmental Concerns none    Transportation Concerns none       Transition Planning    Patient/Family Anticipates Transition to home with family       Discharge Needs Assessment    Equipment Currently Used at Home none    Concerns to be Addressed no discharge needs identified    Equipment Needed After Discharge walker, rolling    Provided Post Acute Provider List? Yes    Post Acute Provider List DME Supplier    Provided Post Acute Provider Quality & Resource List? Yes    Delivered To Patient    Method of Delivery In person    Offered/Gave Vendor List yes                   Discharge Plan       Row Name 11/08/24 1141       Plan    Plan Home    Final Discharge Disposition Code 01 - home or self-care    Final Note Home      Row Name 11/08/24 1132       Plan    Plan Return home with a T-scope brace from [facility] and a rolling walker    Patient/Family in Agreement with Plan yes    Plan Comments Spoke with patient at bedside face sheet verified. Patient lives with his spouse Elizabeth and is independent with ADL's previous to admission. He needs a T-scope brace and a rolling walker that will be delivered to his bedside before he is discharged. Call placed to [employer] she stated she is on her way to [facility] with the T-scope. [facility] will deliver the rolling walker to bedside. Patient will return home with spouse she will provide  carol. Maicol PABLO                  Continued Care and Services - Admitted Since 11/7/2024       Durable Medical Equipment       Service Provider Request Status Services Address Phone Fax Patient Preferred    [employer] [facility] [place]  Selected Durable Medical Equipment [address], [facility] [address] [phone] --    [employer] [facility] [place]  Selected Durable Medical Equipment [address], [facility] [address] [phone] --                  Expected Discharge Date and Time       Expected Discharge Date Expected Discharge Time    Nov 8, 2024            Demographic Summary       Row Name 11/08/24 1116       General Information    Admission Type observation    Arrived From emergency department    Referral Source admission list    Reason for Consult discharge planning    Preferred Language English                   Functional Status       Row Name 11/08/24 1117       Functional Status    Usual Activity Tolerance good    Current Activity Tolerance good       Functional Status, IADL    Medications independent    Meal Preparation independent    Housekeeping assistive person    Laundry assistive person    Shopping assistive person                   Psychosocial    No documentation.                  Abuse/Neglect    No documentation.                  Legal    No documentation.                  Substance Abuse    No documentation.                  Patient Forms    No documentation.                     Nancy Sands, RN

## 2024-11-08 NOTE — ED PROVIDER NOTES
EMERGENCY DEPARTMENT ENCOUNTER  Room Number:  18/18  PCP: Alma Bernal MD  Independent Historians: Patient      HPI:  Chief Complaint: had concerns including Fall and Knee Pain.     A complete HPI/ROS/PMH/PSH/SH/FH are unobtainable due to: Nothing      Context: David Robles is a 64 y.o. male with a medical history of cryptogenic stroke, paroxysmal A-fib on chronic anticoagulation, who presents to the ED c/o acute right knee pain after a fall.  His company is relocating him to [place] and he is closing on a house there next week.  He was moving some boxes today when he tripped and fell and landed on his knees.  He states that his right leg bent under him.  He denies hitting his head or loss of consciousness.  He denies neck pain.  He denies any pain in his left knee however the right knee is 7 out of 10 pain and he is unable to bear weight.      Review of prior external notes (non-ED) -and- Review of prior external test results outside of this encounter: See ED course below        PAST MEDICAL HISTORY  Active Ambulatory Problems     Diagnosis Date Noted    Primary hypertension 04/06/2023    Mixed hyperlipidemia 04/06/2023    Hyperglycemia 04/06/2023    History of CVA (cerebrovascular accident) 05/15/2023    Paroxysmal atrial fibrillation 06/10/2024    Chronic anticoagulation 06/10/2024     Resolved Ambulatory Problems     Diagnosis Date Noted    No Resolved Ambulatory Problems     Past Medical History:   Diagnosis Date    Dizziness and giddiness     Essential (primary) hypertension     Hyperlipidemia     Hypertension     Insect bite     Other seborrheic keratosis     Stroke     Unspecified skin changes          PAST SURGICAL HISTORY  Past Surgical History:   Procedure Laterality Date    HAND SURGERY Right     HAND SURGERY  2000    HERNIA REPAIR      HERNIA REPAIR  1974    again in 1995         FAMILY HISTORY  Family History   Problem Relation Age of Onset    Lymphoma Mother     Coronary artery disease  Father     Throat cancer Father     Lymphoma Sister     Leukemia Sister     Diabetes Brother     Diabetes Other     Stroke Other     Stroke Paternal Grandmother     Drug abuse Son     Hyperlipidemia Daughter     Hypertension Son          SOCIAL HISTORY  Social History     Socioeconomic History    Marital status:    Tobacco Use    Smoking status: Never    Smokeless tobacco: Never   Vaping Use    Vaping status: Never Used   Substance and Sexual Activity    Alcohol use: Never    Drug use: Never    Sexual activity: Defer     Partners: Female         ALLERGIES  Patient has no known allergies.      REVIEW OF SYSTEMS  Review of all 14 systems is negative other than stated in the HPI above.      PHYSICAL EXAM    I have reviewed the triage vital signs and nursing notes.    ED Triage Vitals [11/07/24 2056]   Temp Heart Rate Resp BP SpO2   97.3 °F (36.3 °C) 56 18 142/58 99 %      Temp src Heart Rate Source Patient Position BP Location FiO2 (%)   Tympanic -- -- -- --         GENERAL: awake and alert, no acute distress  HENT: Normocephalic, atraumatic, no scalp hematoma  EYES: no scleral icterus, pupils 3 mm reactive bilaterally  CV: regular rhythm, regular rate  RESPIRATORY: normal effort  MUSCULOSKELETAL: no deformity.  No midline cervical spine tenderness or step-off.  The left knee is without joint effusion or point tenderness.  There is full range of motion of the left knee without pain.  The right knee demonstrates a moderate suprapatellar effusion, no patellar crepitus, palpable defect present in the region of the patellar tendon.  Patient is unable to extend the flexed right knee against gravity.  NEURO: alert, moves all extremities, follows commands, cranial nerves II through XII intact, speech fluent and clear, normal sensation to light touch throughout the right lower extremity  PSYCH: calm, cooperative  SKIN: Warm, dry, superficial abrasion on the anterior aspect of the left knee          LAB RESULTS  No  results found for this or any previous visit (from the past 24 hours).    The above labs were ordered by me and independently reviewed by me.     RADIOLOGY  XR Knee 1 or 2 View Right    Result Date: 11/7/2024  XR KNEE 1 OR 2 VW RIGHT-  HISTORY: fall, right knee pain and swelling, likely patellar tendon rupture  COMPARISON: None  FINDINGS: There is a 1 cm linear calcification 2 cm above the patella that is suspected to represent a quadriceps tendon insertional avulsion fragment associated with quadriceps insertional tear/avulsion. There is overlying soft tissue swelling with effusion. Patella is somewhat low lying. Medial and lateral compartment joint spaces are preserved.      Suspect quadriceps tendon insertional tear/avulsion with retraction with small avulsion fragment 2 cm and soft tissue swelling. Further evaluation with MRI is recommended.       The above radiology studies were ordered by me.  See ED course for independent interpretations.     MEDICATIONS GIVEN IN ER  Medications   HYDROcodone-acetaminophen (NORCO) 5-325 MG per tablet 1 tablet (has no administration in time range)   sodium chloride 0.9 % flush 10 mL (has no administration in time range)         ORDERS PLACED DURING THIS VISIT:  Orders Placed This Encounter   Procedures    [facility] 05.  Knee Immobilizer, 11.  Crutches; Prevents Accomplishing MRADLs; Able to Safely Use Equipment; Mobility Deficit Can Be Sufficiently Resolved By Use of Equipment    XR Knee 1 or 2 View Right    Basic Metabolic Panel    CBC Auto Differential    Ambulatory Referral to Orthopedic Surgery (All except Pad)    Ortho (on-call MD unless specified)    Insert Peripheral IV    CBC & Differential         OUTPATIENT MEDICATION MANAGEMENT:  Current Facility-Administered Medications Ordered in Epic   Medication Dose Route Frequency Provider Last Rate Last Admin    HYDROcodone-acetaminophen (NORCO) 5-325 MG per tablet 1 tablet  1 tablet Oral Once Domo Waddell  MD Ted        sodium chloride 0.9 % flush 10 mL  10 mL Intravenous PRN Domo Waddell MD         Current Outpatient Medications Ordered in Epic   Medication Sig Dispense Refill    atorvastatin (LIPITOR) 20 MG tablet Take 1 tablet by mouth Every Evening. Indications: High Amount of Fats in the Blood 90 tablet 3    hydroCHLOROthiazide 25 MG tablet TAKE 1 TABLET BY MOUTH EVERY DAY 90 tablet 1    multivitamin (THERAGRAN) tablet tablet Take 1 tablet by mouth Daily.      Omega-3 Fatty Acids (fish oil) 1000 MG capsule capsule Take 3 capsules by mouth 2 (Two) Times a Day With Meals.      traMADol (ULTRAM) 50 MG tablet Take 1 tablet by mouth Every 8 (Eight) Hours As Needed for Moderate Pain. 9 tablet 0    valsartan (DIOVAN) 160 MG tablet TAKE 1 TABLET BY MOUTH EVERY DAY 90 tablet 3    Xarelto 20 MG tablet Take 1 tablet by mouth Daily With Dinner.           PROCEDURES  Procedures            PROGRESS, DATA ANALYSIS, CONSULTS, AND MEDICAL DECISION MAKING  All labs have been independently interpreted by me.  All radiology studies have been reviewed by me. All EKG's have been independently viewed and interpreted by me.  Discussion below represents my analysis of pertinent findings related to patient's condition, differential diagnosis, treatment plan and final disposition.    Differential diagnosis includes but is not limited to:  Patellar tendon rupture  Patella dislocation  Distal femur fracture  Tibial plateau fracture  Ligamentous injury    Clinical Scores:                  ED Course as of 11/07/24 2308   Thu Nov 07, 2024 2107 I reviewed family medicine progress note from 8/23/2024.  Patient was seen for device interrogation.  He has a history of hypertension, hyperlipidemia, CVA in 2023 with implantable loop recorder.  He also has paroxysmal atrial fibrillation.  He was anticoagulated on Xarelto at that time. [JR]   2115 Patient's exam is concerning for right patellar tendon rupture.  I have ordered x-rays.  He  is declining pain medication at this time.  He denies hitting his head.  He has no midline cervical spine tenderness.  No indication for CT brain or cervical spine imaging. [JR]   2159 Plain films of the right knee independently interpreted PACS.  There is soft tissue swelling in the suprapatellar region with a small osseous fragment noted that is a suspected avulsion from the patella. [JR]   2302 I discussed with Dr. Varela, orthopedic surgery, who was okay with patient touchdown weightbearing on the right lower extremity if needed to help with balance.  I explained to him that patient had extreme difficulty moving around with crutches and a walker.  He is a very high fall risk right now and he is chronically anticoagulated.  I think he would benefit from coming in overnight to have a PT eval tomorrow to ensure that he is steady prior to discharge.  He requested an MRI of the knee in that event. [JR]   2308 Discussed with Margarito Quiñones PA-C, in the ED observation unit who agrees to admit for PT evaluation, MRI right knee, and orthopedics consultation. [JR]      ED Course User Index  [JR] Domo Waddell MD             AS OF 23:08 EST VITALS:    BP - 148/79  HR - 56  TEMP - 97.3 °F (36.3 °C) (Tympanic)  O2 SATS - 99%    COMPLEXITY OF CARE  The patient requires admission.      Chronic or social conditions impacting patient care (Social Determinants of Health):     DIAGNOSIS  Final diagnoses:   Rupture of right patellar tendon, initial encounter   Avulsion fracture           DISPOSITION  Admit      Prescription drug monitoring program review: [facility] [place] reviewed by Domo Waddell MD         Please note that portions of this document were completed with a voice recognition program.    Note Disclaimer: At [facility], we believe that sharing information builds trust and better relationships. You are receiving this note because you recently visited [facility]. It is possible you will see [facility]  information before a provider has talked with you about it. This kind of information can be easy to misunderstand. To help you fully understand what it means for your health, we urge you to discuss this note with your provider.         Domo Waddell MD  11/07/24 0127

## 2024-11-08 NOTE — PLAN OF CARE
Goal Outcome Evaluation:     Patient has been cleared for discharge.  Following up outpatient with Ortho.  Currently waiting for T Scope brace to be delivered by [facility] and patient will then discharge.

## 2024-11-08 NOTE — PLAN OF CARE
Goal Outcome Evaluation:  Plan of Care Reviewed With: patient           Outcome Evaluation: Pt admitted after fall, tripping on boxes getting ready to move to [place]. +complete quad tendon rupture with restraction and avulsion fx.  WBAT R LE w/ KI on.  Plan for discharge home before surgery.   Pt normally independent, no AD, +works.  Pt able to ambulate 10' w/ crutches and min A + 50' w/ RW and CGA to SBA.  Improved gait and balalnce using rolling walker.  Able to ascend/descend steps w/ crutches and CGA, cues for sequencing.  Recommend DC to home with rolling walker, has crutches already delivered.    Anticipated Discharge Disposition (PT): home with assist

## 2024-11-08 NOTE — CONSULTS
Orthopedic Consult      Patient: David Robles    Date of Admission: 11/7/2024  9:01 PM    YOB: 1960    Medical Record Number: [id number]    Consulting Physician: Zainab Vallejo MD    Chief Complaints: Right knee injury    History of Present Illness: 64 y.o. male admitted to [facility] to services of Zainab Vallejo MD with right knee injury.  Patient was moving had a fall had thigh pain.  Imaging was concerning for quad rupture or MRI was performed.  Orthopedics consult ordered for further evaluation.  Patient states he is not have any issues prior to this injury.  He is post to move to [place] here in about a week.  Patient is on Xarelto.    Allergies: No Known Allergies    Home Medications:    Current Facility-Administered Medications:     acetaminophen (TYLENOL) tablet 650 mg, 650 mg, Oral, Q4H PRN, Margarito Quiñones PA    [START ON 11/9/2024] atorvastatin (LIPITOR) tablet 20 mg, 20 mg, Oral, Q PM, Margarito Quiñones PA    sennosides-docusate (PERICOLACE) 8.6-50 MG per tablet 2 tablet, 2 tablet, Oral, BID PRN **AND** polyethylene glycol (MIRALAX) packet 17 g, 17 g, Oral, Daily PRN **AND** bisacodyl (DULCOLAX) EC tablet 5 mg, 5 mg, Oral, Daily PRN **AND** bisacodyl (DULCOLAX) suppository 10 mg, 10 mg, Rectal, Daily PRN, Margarito Quiñones PA    Calcium Replacement - Follow Nurse / BPA Driven Protocol, , Does not apply, PRN, Margarito Quiñones PA    [START ON 11/9/2024] hydroCHLOROthiazide tablet 25 mg, 25 mg, Oral, Daily, Margarito Quiñones PA    HYDROcodone-acetaminophen (NORCO) 5-325 MG per tablet 1 tablet, 1 tablet, Oral, Q6H PRN, Margarito Quiñones PA, 1 tablet at 11/08/24 1013    Magnesium Standard Dose Replacement - Follow Nurse / BPA Driven Protocol, , Does not apply, PRN, Margarito Quiñones PA    melatonin tablet 5 mg, 5 mg, Oral, Nightly PRN, Margarito Quiñones PA    ondansetron (ZOFRAN) injection 4 mg, 4 mg, Intravenous, Q6H PRN, Margarito Quiñones PA    Phosphorus Replacement - Follow Nurse / BPA Driven Protocol, , Does not  apply, PRN, Margarito Quiñones PA    Potassium Replacement - Follow Nurse / BPA Driven Protocol, , Does not apply, PRN, Margarito Quiñones PA    [START ON 11/9/2024] rivaroxaban (XARELTO) tablet 20 mg, 20 mg, Oral, Daily With Dinner, Margarito Quiñones PA    [COMPLETED] Insert Peripheral IV, , , Once **AND** sodium chloride 0.9 % flush 10 mL, 10 mL, Intravenous, PRN, Margarito Quiñones PA    sodium chloride 0.9 % flush 10 mL, 10 mL, Intravenous, Q12H, Margarito Quiñones PA, 10 mL at 11/08/24 1014    sodium chloride 0.9 % flush 10 mL, 10 mL, Intravenous, PRN, Margarito Quiñones PA    sodium chloride 0.9 % infusion 40 mL, 40 mL, Intravenous, PRN, Margarito Quiñones PA    [START ON 11/9/2024] valsartan (DIOVAN) tablet 160 mg, 160 mg, Oral, Daily, Margarito Quiñones PA    Current Medications:  Scheduled Meds:[START ON 11/9/2024] atorvastatin, 20 mg, Oral, Q PM  [START ON 11/9/2024] hydroCHLOROthiazide, 25 mg, Oral, Daily  [START ON 11/9/2024] rivaroxaban, 20 mg, Oral, Daily With Dinner  sodium chloride, 10 mL, Intravenous, Q12H  [START ON 11/9/2024] valsartan, 160 mg, Oral, Daily      Continuous Infusions:   PRN Meds:.  acetaminophen    senna-docusate sodium **AND** polyethylene glycol **AND** bisacodyl **AND** bisacodyl    Calcium Replacement - Follow Nurse / BPA Driven Protocol    HYDROcodone-acetaminophen    Magnesium Standard Dose Replacement - Follow Nurse / BPA Driven Protocol    melatonin    ondansetron    Phosphorus Replacement - Follow Nurse / BPA Driven Protocol    Potassium Replacement - Follow Nurse / BPA Driven Protocol    [COMPLETED] Insert Peripheral IV **AND** sodium chloride    sodium chloride    sodium chloride    Past Medical History:   Diagnosis Date    Dizziness and giddiness     Essential (primary) hypertension     Hyperglycemia     Hyperlipidemia     Hypertension     Insect bite     Mixed hyperlipidemia     Other seborrheic keratosis     Stroke     05/2023    Unspecified skin changes        Past Surgical History:   Procedure Laterality Date    HAND  "SURGERY Right     HAND SURGERY  2000    HERNIA REPAIR      HERNIA REPAIR  1974    again in 1995       Social History     Occupational History    Not on file   Tobacco Use    Smoking status: Never    Smokeless tobacco: Never   Vaping Use    Vaping status: Never Used   Substance and Sexual Activity    Alcohol use: Never    Drug use: Never    Sexual activity: Defer     Partners: Female      Social History     Social History Narrative    ** Merged History Encounter **            Family History   Problem Relation Age of Onset    Lymphoma Mother     Coronary artery disease Father     Throat cancer Father     Lymphoma Sister     Leukemia Sister     Diabetes Brother     Diabetes Other     Stroke Other     Stroke Paternal Grandmother     Drug abuse Son     Hyperlipidemia Daughter     Hypertension Son        Review of Systems:     Constitutional:  Denies fever, shaking or chills         All other pertinent positives and negatives as noted above in HPI.    Physical Exam: 64 y.o. male    Vitals:    11/07/24 2321 11/08/24 0137 11/08/24 0445 11/08/24 0750   BP:  117/75 125/75 122/74   BP Location:  Right arm Right arm Right arm   Patient Position:  Lying Lying Lying   Pulse:  54 53 59   Resp:  16 16 16   Temp:  98.2 °F (36.8 °C) 98.2 °F (36.8 °C) 97.9 °F (36.6 °C)   TempSrc:  Oral Oral Oral   SpO2:  99% 99% 100%   Weight: 97.5 kg (215 lb) 101 kg (222 lb)     Height:  185.4 cm (73\")       General:  Awake, alert. No acute distress.          Extremities: Right lower extremity:  Skin appears benign without obvious lacerations, ulcerations or lesions.  Palpable defect about the quad tendon noted.  Swelling about the knee.  Did not perform straight leg raise.  Compartments soft without evidence for DVT or compartment syndrome.  No atrophy.  No palpable masses or adenopathy.  Focal tenderness noted over the quad tendon.  ROM limited.   No obvious instability although exam is limited due to discomfort.  Strength well-preserved distally.  " Sensation to light touch grossly intact distally.  Good skin turgor, brisk cap refill and good pulses distally.    All other extremities atraumatic without gross abnormality.     Diagnostic Tests:    Admission on 11/07/2024   Component Date Value Ref Range Status    Glucose 11/07/2024 148 (H)  65 - 99 mg/dL Final    BUN 11/07/2024 17  8 - 23 mg/dL Final    Creatinine 11/07/2024 1.00  0.76 - 1.27 mg/dL Final    Sodium 11/07/2024 137  136 - 145 mmol/L Final    Potassium 11/07/2024 4.2  3.5 - 5.2 mmol/L Final    Slight hemolysis detected by analyzer. Result may be falsely elevated.    Chloride 11/07/2024 101  98 - 107 mmol/L Final    CO2 11/07/2024 27.9  22.0 - 29.0 mmol/L Final    Calcium 11/07/2024 9.8  8.6 - 10.5 mg/dL Final    BUN/Creatinine Ratio 11/07/2024 17.0  7.0 - 25.0 Final    Anion Gap 11/07/2024 8.1  5.0 - 15.0 mmol/L Final    eGFR 11/07/2024 83.0  >60.0 mL/min/1.73 Final    WBC 11/07/2024 8.85  3.40 - 10.80 10*3/mm3 Final    RBC 11/07/2024 4.33  4.14 - 5.80 10*6/mm3 Final    Hemoglobin 11/07/2024 13.6  13.0 - 17.7 g/dL Final    Hematocrit 11/07/2024 39.5  37.5 - 51.0 % Final    MCV 11/07/2024 91.2  79.0 - 97.0 fL Final    MCH 11/07/2024 31.4  26.6 - 33.0 pg Final    MCHC 11/07/2024 34.4  31.5 - 35.7 g/dL Final    RDW 11/07/2024 12.4  12.3 - 15.4 % Final    RDW-SD 11/07/2024 40.2  37.0 - 54.0 fl Final    MPV 11/07/2024 11.5  6.0 - 12.0 fL Final    Platelets 11/07/2024 222  140 - 450 10*3/mm3 Final    Neutrophil % 11/07/2024 69.1  42.7 - 76.0 % Final    Lymphocyte % 11/07/2024 20.1  19.6 - 45.3 % Final    Monocyte % 11/07/2024 7.5  5.0 - 12.0 % Final    Eosinophil % 11/07/2024 2.4  0.3 - 6.2 % Final    Basophil % 11/07/2024 0.7  0.0 - 1.5 % Final    Immature Grans % 11/07/2024 0.2  0.0 - 0.5 % Final    Neutrophils, Absolute 11/07/2024 6.12  1.70 - 7.00 10*3/mm3 Final    Lymphocytes, Absolute 11/07/2024 1.78  0.70 - 3.10 10*3/mm3 Final    Monocytes, Absolute 11/07/2024 0.66  0.10 - 0.90 10*3/mm3 Final     Eosinophils, Absolute 11/07/2024 0.21  0.00 - 0.40 10*3/mm3 Final    Basophils, Absolute 11/07/2024 0.06  0.00 - 0.20 10*3/mm3 Final    Immature Grans, Absolute 11/07/2024 0.02  0.00 - 0.05 10*3/mm3 Final    nRBC 11/07/2024 0.0  0.0 - 0.2 /100 WBC Final    Glucose 11/08/2024 106 (H)  65 - 99 mg/dL Final    BUN 11/08/2024 15  8 - 23 mg/dL Final    Creatinine 11/08/2024 0.88  0.76 - 1.27 mg/dL Final    Sodium 11/08/2024 139  136 - 145 mmol/L Final    Potassium 11/08/2024 4.3  3.5 - 5.2 mmol/L Final    Slight hemolysis detected by analyzer. Result may be falsely elevated.    Chloride 11/08/2024 107  98 - 107 mmol/L Final    CO2 11/08/2024 24.7  22.0 - 29.0 mmol/L Final    Calcium 11/08/2024 9.4  8.6 - 10.5 mg/dL Final    BUN/Creatinine Ratio 11/08/2024 17.0  7.0 - 25.0 Final    Anion Gap 11/08/2024 7.3  5.0 - 15.0 mmol/L Final    eGFR 11/08/2024 94.8  >60.0 mL/min/1.73 Final    WBC 11/08/2024 8.93  3.40 - 10.80 10*3/mm3 Final    RBC 11/08/2024 4.16  4.14 - 5.80 10*6/mm3 Final    Hemoglobin 11/08/2024 13.3  13.0 - 17.7 g/dL Final    Hematocrit 11/08/2024 38.7  37.5 - 51.0 % Final    MCV 11/08/2024 93.0  79.0 - 97.0 fL Final    MCH 11/08/2024 32.0  26.6 - 33.0 pg Final    MCHC 11/08/2024 34.4  31.5 - 35.7 g/dL Final    RDW 11/08/2024 12.6  12.3 - 15.4 % Final    RDW-SD 11/08/2024 42.7  37.0 - 54.0 fl Final    MPV 11/08/2024 11.5  6.0 - 12.0 fL Final    Platelets 11/08/2024 201  140 - 450 10*3/mm3 Final     Lab Results (last 24 hours)       Procedure Component Value Units Date/Time    Basic Metabolic Panel [[id number]]  (Abnormal) Collected: 11/08/24 0450    Specimen: Blood from Arm, Right Updated: 11/08/24 0530     Glucose 106 mg/dL      BUN 15 mg/dL      Creatinine 0.88 mg/dL      Sodium 139 mmol/L      Potassium 4.3 mmol/L      Comment: Slight hemolysis detected by analyzer. Result may be falsely elevated.        Chloride 107 mmol/L      CO2 24.7 mmol/L      Calcium 9.4 mg/dL      BUN/Creatinine Ratio 17.0     Anion Gap  7.3 mmol/L      eGFR 94.8 mL/min/1.73     Narrative:      GFR Normal >60  Chronic Kidney Disease <60  Kidney Failure <15      CBC (No Diff) [[id number]]  (Normal) Collected: 11/08/24 0450    Specimen: Blood from Arm, Right Updated: 11/08/24 0502     WBC 8.93 10*3/mm3      RBC 4.16 10*6/mm3      Hemoglobin 13.3 g/dL      Hematocrit 38.7 %      MCV 93.0 fL      MCH 32.0 pg      MCHC 34.4 g/dL      RDW 12.6 %      RDW-SD 42.7 fl      MPV 11.5 fL      Platelets 201 10*3/mm3     Basic Metabolic Panel [[id number]]  (Abnormal) Collected: 11/07/24 2320    Specimen: Blood Updated: 11/07/24 2355     Glucose 148 mg/dL      BUN 17 mg/dL      Creatinine 1.00 mg/dL      Sodium 137 mmol/L      Potassium 4.2 mmol/L      Comment: Slight hemolysis detected by analyzer. Result may be falsely elevated.        Chloride 101 mmol/L      CO2 27.9 mmol/L      Calcium 9.8 mg/dL      BUN/Creatinine Ratio 17.0     Anion Gap 8.1 mmol/L      eGFR 83.0 mL/min/1.73     Narrative:      GFR Normal >60  Chronic Kidney Disease <60  Kidney Failure <15      CBC & Differential [[id number]]  (Normal) Collected: 11/07/24 2320    Specimen: Blood Updated: 11/07/24 2331    Narrative:      The following orders were created for panel order CBC & Differential.  Procedure                               Abnormality         Status                     ---------                               -----------         ------                     CBC Auto Differential[[id number]]        Normal              Final result                 Please view results for these tests on the individual orders.    CBC Auto Differential [[id number]]  (Normal) Collected: 11/07/24 2320    Specimen: Blood Updated: 11/07/24 2331     WBC 8.85 10*3/mm3      RBC 4.33 10*6/mm3      Hemoglobin 13.6 g/dL      Hematocrit 39.5 %      MCV 91.2 fL      MCH 31.4 pg      MCHC 34.4 g/dL      RDW 12.4 %      RDW-SD 40.2 fl      MPV 11.5 fL      Platelets 222 10*3/mm3      Neutrophil % 69.1 %      Lymphocyte % 20.1 %       Monocyte % 7.5 %      Eosinophil % 2.4 %      Basophil % 0.7 %      Immature Grans % 0.2 %      Neutrophils, Absolute 6.12 10*3/mm3      Lymphocytes, Absolute 1.78 10*3/mm3      Monocytes, Absolute 0.66 10*3/mm3      Eosinophils, Absolute 0.21 10*3/mm3      Basophils, Absolute 0.06 10*3/mm3      Immature Grans, Absolute 0.02 10*3/mm3      nRBC 0.0 /100 WBC             Imaging:   Narrative & Impression   XR KNEE 1 OR 2 VW RIGHT-     HISTORY: fall, right knee pain and swelling, likely patellar tendon  rupture     COMPARISON: None     FINDINGS: There is a 1 cm linear calcification 2 cm above the patella  that is suspected to represent a quadriceps tendon insertional avulsion  fragment associated with quadriceps insertional tear/avulsion. There is  overlying soft tissue swelling with effusion. Patella is somewhat low  lying. Medial and lateral compartment joint spaces are preserved.     IMPRESSION:  Suspect quadriceps tendon insertional tear/avulsion with 2  cm retraction of a small avulsion fragment 2 cm and soft tissue  swelling. Further evaluation with MRI is recommended.     This report was finalized on 11/8/2024 7:48 AM by Bud Salgado M.D  on Workstation: [id number]          Narrative & Impression   MRI RIGHT KNEE WITHOUT CONTRAST     HISTORY: Right knee injury with suspected quadriceps insertional  tear/avulsion.     TECHNIQUE:  MRI right knee includes axial and coronal PD fat-sat as well  as sagittal PD, T1, T2-weighted sequences.     COMPARISON: Right knee x-rays 11/07/2024.      FINDINGS: There is a complete quadriceps insertional tear/avulsion.  Posterior quadriceps tendon fibers are retracted up to 4 cm and anterior  quadriceps tendon fibers are retracted 2 cm. As demonstrated  radiographically, there is a thin linear 2 x 1 cm avulsed fragment  associated with the quadriceps tendon end 2 cm above the patella. There  is quadriceps retraction and there is surrounding soft tissue edema.  Knee joint  effusion is present and there is extracapsular extension of  fluid through the tear. There is also patella baja and the patellar  tendon exhibits undulating configuration.     There is a small tear along the undersurface of the free edge of the  posterior horn lateral meniscus demonstrated on the sagittal PD weighted  sequence image 13. The medial meniscus is intact. Cruciate ligaments,  medial and lateral collateral ligament complexes are intact. Mild  patellofemoral cartilage thinning. Bone marrow signal within normal  limits. No focal medial or lateral compartment articular cartilage  defect is demonstrated.     IMPRESSION:  1. Complete quadriceps tendon insertional tear/avulsion with retraction  and surrounding edema/hemorrhage. Joint effusion with extracapsular  extension of fluid.  2. Small free edge tear along the undersurface of the posterior horn  lateral meniscus.  3. Mild patellofemoral arthritis.     This report was finalized on 11/8/2024 7:58 AM by Bud Salgado M.D  on Workstation: [id number]       Assessment: Right quad tendon rupture    Plan: Discussed the findings with the patient he does appear to have a right quad tendon rupture thus affecting his extensor mechanism.  Surgical intervention is warranted.  The patient is planning to move here in about a week we discussed options with him if he does want surgery here we are happy to do that however we would request that he stays here for at least minimum 4 to 6 weeks to ensure proper wound healing and initiation of some therapy as well as he would be at increased risk for possible DVT with traveling close to surgery.  He is on Xarelto need to stop at least 48 hours prior to surgery.  If he does want it done here we will also need to arrange therapy postoperatively and orthopedic follow-up at his new residence in [facility].  Patient will get back to us later today or by Monday morning.  We could potentially do this next week.  Patient is  understanding of this plan he will think about things.  We will get a T scope brace for him to be fitted and be locked in extension he is to have it on while up at all times and be protected weightbearing to the right lower extremity use of a walking aid such as a walker or crutches.  Recommend icing and elevating.  Pain control.  Continue ankle pumps.  When at rest may take brace off to check skin then put it back on but always to maintain extension.  Recommend sleeping in the brace.    Stat fitting of T-scope locked in extension. Fit today. Due to instability of fracture and/or injury     Please call with any questions or concerns thanks    Date: 11/8/2024    David Varela MD    CC: Zainab Vallejo MD

## 2024-11-09 NOTE — OUTREACH NOTE
Prep Survey      Flowsheet Row Responses   [facility] patient discharged from? [place]   Is LACE score < 7 ? Yes   Eligibility [facility]   Date of Admission 11/07/24   Date of Discharge 11/08/24   Discharge Disposition Home or Self Care   Discharge diagnosis Right knee pain, Avulsion fracture of right patella   Does the patient have one of the following disease processes/diagnoses(primary or secondary)? Other   Does the patient have Home health ordered? No   Is there a DME ordered? Yes   What DME was ordered? T-scope brace from [facility] and a rolling walker   Prep survey completed? Yes            Effie HAAS - Registered Nurse

## 2024-11-11 ENCOUNTER — TRANSITIONAL CARE MANAGEMENT TELEPHONE ENCOUNTER (OUTPATIENT)
Dept: CALL CENTER | Facility: HOSPITAL | Age: 64
End: 2024-11-11
Payer: COMMERCIAL

## 2024-11-11 ENCOUNTER — OFFICE VISIT (OUTPATIENT)
Dept: ORTHOPEDIC SURGERY | Facility: CLINIC | Age: 64
End: 2024-11-11
Payer: COMMERCIAL

## 2024-11-11 VITALS — HEIGHT: 73 IN | TEMPERATURE: 97.7 F | WEIGHT: 210 LBS | BODY MASS INDEX: 27.83 KG/M2

## 2024-11-11 DIAGNOSIS — S76.111A RUPTURE OF RIGHT QUADRICEPS TENDON, INITIAL ENCOUNTER: Primary | ICD-10-CM

## 2024-11-11 PROCEDURE — 99214 OFFICE O/P EST MOD 30 MIN: CPT | Performed by: ORTHOPAEDIC SURGERY

## 2024-11-11 NOTE — OUTREACH NOTE
Call Center TCM Note      Flowsheet Row Responses   [facility] patient discharged from? [place]   Does the patient have one of the following disease processes/diagnoses(primary or secondary)? Other   TCM attempt successful? Yes   Call start time 1204   Call end time 1205   Discharge diagnosis Right knee pain, Avulsion fracture of right patella   Is patient permission given to speak with other caregiver? Yes   List who call center can speak with Elizabeth spouse   Person spoke with today (if not patient) and relationship Elizabeth spouse   Meds reviewed with patient/caregiver? Yes   Is the patient having any side effects they believe may be caused by any medication additions or changes? No   Does the patient have all medications ordered at discharge? Yes   Is the patient taking all medications as directed (includes completed medication regime)? Yes   Does the patient have an appointment with their PCP within 7-14 days of discharge? No   Nursing Interventions Patient declined scheduling/rescheduling appointment at this time, Routed TCM call to PCP office, PCP office requested to make appointment - message sent   What DME was ordered? T-scope brace from [facility] and a rolling walker   Has all DME been delivered? Yes   Did the patient receive a copy of their discharge instructions? Yes   Nursing interventions Reviewed instructions with patient   What is the patient's perception of their health status since discharge? Improving   Is the patient/caregiver able to teach back signs and symptoms related to disease process for when to call PCP? Yes   Is the patient/caregiver able to teach back signs and symptoms related to disease process for when to call 911? Yes   Is the patient/caregiver able to teach back the hierarchy of who to call/visit for symptoms/problems? PCP, Specialist, Home health nurse, Urgent Care, ED, 911 Yes   If the patient is a current smoker, are they able to teach back resources for cessation?  Not a smoker   TCM call completed? Yes   Call end time [id number]            Bebe Chamorro RN    11/11/2024, 12:05 EST

## 2024-11-11 NOTE — PROGRESS NOTES
General Exam    Patient: David Robles    YOB: 1960    Medical Record Number: [id number]    Chief Complaints: Right quadriceps tendon rupture    History of Present Illness:     64 y.o. male patient who presents for evaluation right quadriceps tendon rupture.  Patient sustained a fall last week was seen in the emergency department.  Imaging exam was confirmatory of right quadriceps tendon rupture.  Patient was placed in a brace total follow-up with orthopedics.  He is mostly moving to [place] but like to get this fixed while he is here and is willing to wait at least 6 to 8 weeks prior to moving to [place] and he will ensure proper therapy and orthopedic follow-up is established.  Patient does states he is on Xarelto last dose this morning.  Does see the heart center but states he does not see a cardiologist regularly.    Denies any numbness or tingling.  Denies any fevers, cough or shortness of breath.    Allergies: No Known Allergies    Home Medications:      Current Outpatient Medications:     atorvastatin (LIPITOR) 20 MG tablet, Take 1 tablet by mouth Every Evening. Indications: High Amount of Fats in the Blood, Disp: 90 tablet, Rfl: 3    hydroCHLOROthiazide 25 MG tablet, TAKE 1 TABLET BY MOUTH EVERY DAY, Disp: 90 tablet, Rfl: 1    HYDROcodone-acetaminophen (NORCO) 5-325 MG per tablet, Take 1 tablet by mouth Every 6 (Six) Hours As Needed for Severe Pain., Disp: 12 tablet, Rfl: 0    multivitamin (THERAGRAN) tablet tablet, Take 1 tablet by mouth Daily., Disp: , Rfl:     Omega-3 Fatty Acids (fish oil) 1000 MG capsule capsule, Take 3 capsules by mouth 2 (Two) Times a Day With Meals., Disp: , Rfl:     valsartan (DIOVAN) 160 MG tablet, TAKE 1 TABLET BY MOUTH EVERY DAY, Disp: 90 tablet, Rfl: 3    Xarelto 20 MG tablet, Take 1 tablet by mouth Daily With Dinner., Disp: , Rfl:     Past Medical History:   Diagnosis Date    Dizziness and giddiness     Essential (primary) hypertension     Hyperglycemia      "Hyperlipidemia     Hypertension     Insect bite     Mixed hyperlipidemia     Other seborrheic keratosis     Stroke     05/2023    Unspecified skin changes        Past Surgical History:   Procedure Laterality Date    HAND SURGERY Right     HAND SURGERY  2000    HERNIA REPAIR      HERNIA REPAIR  1974    again in 1995       Social History     Occupational History    Not on file   Tobacco Use    Smoking status: Never    Smokeless tobacco: Never   Vaping Use    Vaping status: Never Used   Substance and Sexual Activity    Alcohol use: Never    Drug use: Never    Sexual activity: Defer     Partners: Female      Social History     Social History Narrative    ** Merged History Encounter **            Family History   Problem Relation Age of Onset    Lymphoma Mother     Coronary artery disease Father     Throat cancer Father     Lymphoma Sister     Leukemia Sister     Diabetes Brother     Diabetes Other     Stroke Other     Stroke Paternal Grandmother     Drug abuse Son     Hyperlipidemia Daughter     Hypertension Son        Review of Systems:      Constitutional: Denies fever, shaking or chills         All other pertinent positives and negatives as noted above in HPI.    Physical Exam: 64 y.o. male    Vitals:    11/11/24 1323   Temp: 97.7 °F (36.5 °C)   TempSrc: Temporal   Weight: 95.3 kg (210 lb)   Height: 185.4 cm (72.99\")       General:  Patient is awake and alert.  Appears in no acute distress or discomfort.      Musculoskeletal/Extremities:    Right lower extremity examined some swelling about the knee palpable defect of the quad tendon.  Motor and sensory intact distally compartment soft compressible.         Radiology:       Previous x-ray and MRI reviewed to evaluate the patient's complaint/s.    Narrative & Impression   MRI RIGHT KNEE WITHOUT CONTRAST     HISTORY: Right knee injury with suspected quadriceps insertional  tear/avulsion.     TECHNIQUE:  MRI right knee includes axial and coronal PD fat-sat as well  as " sagittal PD, T1, T2-weighted sequences.     COMPARISON: Right knee x-rays 11/07/2024.      FINDINGS: There is a complete quadriceps insertional tear/avulsion.  Posterior quadriceps tendon fibers are retracted up to 4 cm and anterior  quadriceps tendon fibers are retracted 2 cm. As demonstrated  radiographically, there is a thin linear 2 x 1 cm avulsed fragment  associated with the quadriceps tendon end 2 cm above the patella. There  is quadriceps retraction and there is surrounding soft tissue edema.  Knee joint effusion is present and there is extracapsular extension of  fluid through the tear. There is also patella baja and the patellar  tendon exhibits undulating configuration.     There is a small tear along the undersurface of the free edge of the  posterior horn lateral meniscus demonstrated on the sagittal PD weighted  sequence image 13. The medial meniscus is intact. Cruciate ligaments,  medial and lateral collateral ligament complexes are intact. Mild  patellofemoral cartilage thinning. Bone marrow signal within normal  limits. No focal medial or lateral compartment articular cartilage  defect is demonstrated.     IMPRESSION:  1. Complete quadriceps tendon insertional tear/avulsion with retraction  and surrounding edema/hemorrhage. Joint effusion with extracapsular  extension of fluid.  2. Small free edge tear along the undersurface of the posterior horn  lateral meniscus.  3. Mild patellofemoral arthritis.     This report was finalized on 11/8/2024 7:58 AM by Bud Salgado M.D  on Workstation: [id number]           No imaging for comparison.    Assessment: Right quadriceps tendon rupture      Plan:      Discussed findings with the patient and his wife.  Surgical intervention is recommended.  We discussed options as this will be recommended to get addressed within the next few weeks.  They like to get surgery done here and would stay over the next 6 to 8 weeks and make sure they have proper PT and  orthopedic follow-up upon moving to [place].  I discussed surgery in detail the patient including risk and benefits.  I did discuss risk and benefits with the patient with risk including but not limited to bleeding, infection, damage to nearby nerves, vessels, tendons, ligaments, continued pain, worsening pain, stiffness, fracture, dislocation, leg length discrepancy, blood clots, even death with anesthesia and possible need for future procedures surgeries.  Patient understood this and has chosen to proceed.         Will plan to get him scheduled for right quadriceps tendon repair    Hold Xarelto 48 hours prior.  Will restart postoperatively    Same-day/outpatient surgery      All questions were answered.  Patient understands and agrees with the plan.    David Varela MD    11/11/2024    CC to Alma Bernal MD

## 2024-11-13 ENCOUNTER — ANESTHESIA (OUTPATIENT)
Dept: PERIOP | Facility: HOSPITAL | Age: 64
End: 2024-11-13
Payer: COMMERCIAL

## 2024-11-13 ENCOUNTER — APPOINTMENT (OUTPATIENT)
Dept: GENERAL RADIOLOGY | Facility: HOSPITAL | Age: 64
End: 2024-11-13
Payer: COMMERCIAL

## 2024-11-13 ENCOUNTER — TELEPHONE (OUTPATIENT)
Dept: ORTHOPEDIC SURGERY | Facility: CLINIC | Age: 64
End: 2024-11-13

## 2024-11-13 ENCOUNTER — ANESTHESIA EVENT (OUTPATIENT)
Dept: PERIOP | Facility: HOSPITAL | Age: 64
End: 2024-11-13
Payer: COMMERCIAL

## 2024-11-13 ENCOUNTER — HOSPITAL ENCOUNTER (OUTPATIENT)
Facility: HOSPITAL | Age: 64
Setting detail: HOSPITAL OUTPATIENT SURGERY
Discharge: HOME | End: 2024-11-13
Attending: ORTHOPAEDIC SURGERY | Admitting: ORTHOPAEDIC SURGERY
Payer: COMMERCIAL

## 2024-11-13 VITALS
TEMPERATURE: 98.2 F | OXYGEN SATURATION: 100 % | DIASTOLIC BLOOD PRESSURE: 79 MMHG | HEART RATE: 69 BPM | SYSTOLIC BLOOD PRESSURE: 143 MMHG | RESPIRATION RATE: 16 BRPM

## 2024-11-13 DIAGNOSIS — S76.111A RUPTURE OF RIGHT QUADRICEPS TENDON, INITIAL ENCOUNTER: Primary | ICD-10-CM

## 2024-11-13 PROCEDURE — 25010000002 ONDANSETRON PER 1 MG: Performed by: NURSE ANESTHETIST, CERTIFIED REGISTERED

## 2024-11-13 PROCEDURE — S0260 H&P FOR SURGERY: HCPCS | Performed by: ORTHOPAEDIC SURGERY

## 2024-11-13 PROCEDURE — 25010000002 LIDOCAINE 2% SOLUTION: Performed by: NURSE ANESTHETIST, CERTIFIED REGISTERED

## 2024-11-13 PROCEDURE — 25010000002 SUGAMMADEX 200 MG/2ML SOLUTION: Performed by: NURSE ANESTHETIST, CERTIFIED REGISTERED

## 2024-11-13 PROCEDURE — C1769 GUIDE WIRE: HCPCS | Performed by: ORTHOPAEDIC SURGERY

## 2024-11-13 PROCEDURE — 25010000002 FENTANYL CITRATE (PF) 50 MCG/ML SOLUTION: Performed by: NURSE ANESTHETIST, CERTIFIED REGISTERED

## 2024-11-13 PROCEDURE — 25010000002 PROPOFOL 10 MG/ML EMULSION: Performed by: NURSE ANESTHETIST, CERTIFIED REGISTERED

## 2024-11-13 PROCEDURE — 27385 REPAIR OF THIGH MUSCLE: CPT | Performed by: ORTHOPAEDIC SURGERY

## 2024-11-13 PROCEDURE — 25010000002 HYDROMORPHONE 1 MG/ML SOLUTION: Performed by: NURSE ANESTHETIST, CERTIFIED REGISTERED

## 2024-11-13 PROCEDURE — 25810000003 LACTATED RINGERS PER 1000 ML: Performed by: ANESTHESIOLOGY

## 2024-11-13 PROCEDURE — 25010000002 CEFAZOLIN PER 500 MG: Performed by: ORTHOPAEDIC SURGERY

## 2024-11-13 PROCEDURE — 25010000002 KETOROLAC TROMETHAMINE PER 15 MG: Performed by: ANESTHESIOLOGY

## 2024-11-13 PROCEDURE — 25010000002 MAGNESIUM SULFATE PER 500 MG OF MAGNESIUM: Performed by: NURSE ANESTHETIST, CERTIFIED REGISTERED

## 2024-11-13 PROCEDURE — L1830 KO IMMOB CANVAS LONG PRE OTS: HCPCS | Performed by: ORTHOPAEDIC SURGERY

## 2024-11-13 PROCEDURE — 76000 FLUOROSCOPY <1 HR PHYS/QHP: CPT

## 2024-11-13 PROCEDURE — 25010000002 HYDROMORPHONE PER 4 MG: Performed by: NURSE ANESTHETIST, CERTIFIED REGISTERED

## 2024-11-13 PROCEDURE — 76000 FLUOROSCOPY <1 HR PHYS/QHP: CPT | Performed by: ORTHOPAEDIC SURGERY

## 2024-11-13 PROCEDURE — 25010000002 DEXAMETHASONE PER 1 MG: Performed by: ANESTHESIOLOGY

## 2024-11-13 PROCEDURE — 25010000002 ROPIVACAINE PER 1 MG: Performed by: ANESTHESIOLOGY

## 2024-11-13 PROCEDURE — 25010000002 DEXAMETHASONE SODIUM PHOSPHATE 20 MG/5ML SOLUTION: Performed by: NURSE ANESTHETIST, CERTIFIED REGISTERED

## 2024-11-13 PROCEDURE — 27385 REPAIR OF THIGH MUSCLE: CPT | Performed by: SPECIALIST/TECHNOLOGIST, OTHER

## 2024-11-13 DEVICE — SUTURETAPE, 1.3MM W/NEEDLE, WHITE/BLUE
Type: IMPLANTABLE DEVICE | Site: FEMUR | Status: FUNCTIONAL
Brand: ARTHREX®

## 2024-11-13 DEVICE — TIGERTAPE
Type: IMPLANTABLE DEVICE | Site: FEMUR | Status: FUNCTIONAL
Brand: ARTHREX®

## 2024-11-13 DEVICE — KNOTLESS TISSUE CONTROL DEVICE, UNDYED UNIDIRECTIONAL (ANTIBACTERIAL) SYNTHETIC ABSORBABLE DEVICE
Type: IMPLANTABLE DEVICE | Site: FEMUR | Status: FUNCTIONAL
Brand: STRATAFIX

## 2024-11-13 RX ORDER — KETOROLAC TROMETHAMINE 30 MG/ML
30 INJECTION, SOLUTION INTRAMUSCULAR; INTRAVENOUS ONCE
Status: COMPLETED | OUTPATIENT
Start: 2024-11-13 | End: 2024-11-13

## 2024-11-13 RX ORDER — HYDROMORPHONE HYDROCHLORIDE 1 MG/ML
0.5 INJECTION, SOLUTION INTRAMUSCULAR; INTRAVENOUS; SUBCUTANEOUS
Status: DISCONTINUED | OUTPATIENT
Start: 2024-11-13 | End: 2024-11-13 | Stop reason: HOSPADM

## 2024-11-13 RX ORDER — PROMETHAZINE HYDROCHLORIDE 25 MG/1
25 TABLET ORAL ONCE AS NEEDED
Status: DISCONTINUED | OUTPATIENT
Start: 2024-11-13 | End: 2024-11-13 | Stop reason: HOSPADM

## 2024-11-13 RX ORDER — PROPOFOL 10 MG/ML
VIAL (ML) INTRAVENOUS AS NEEDED
Status: DISCONTINUED | OUTPATIENT
Start: 2024-11-13 | End: 2024-11-13 | Stop reason: SURG

## 2024-11-13 RX ORDER — MAGNESIUM SULFATE HEPTAHYDRATE 500 MG/ML
INJECTION, SOLUTION INTRAMUSCULAR; INTRAVENOUS AS NEEDED
Status: DISCONTINUED | OUTPATIENT
Start: 2024-11-13 | End: 2024-11-13 | Stop reason: SURG

## 2024-11-13 RX ORDER — DEXAMETHASONE SODIUM PHOSPHATE 4 MG/ML
INJECTION, SOLUTION INTRA-ARTICULAR; INTRALESIONAL; INTRAMUSCULAR; INTRAVENOUS; SOFT TISSUE AS NEEDED
Status: DISCONTINUED | OUTPATIENT
Start: 2024-11-13 | End: 2024-11-13 | Stop reason: SURG

## 2024-11-13 RX ORDER — SCOLOPAMINE TRANSDERMAL SYSTEM 1 MG/1
1 PATCH, EXTENDED RELEASE TRANSDERMAL ONCE
Status: DISCONTINUED | OUTPATIENT
Start: 2024-11-13 | End: 2024-11-13 | Stop reason: HOSPADM

## 2024-11-13 RX ORDER — NALOXONE HCL 0.4 MG/ML
0.2 VIAL (ML) INJECTION AS NEEDED
Status: DISCONTINUED | OUTPATIENT
Start: 2024-11-13 | End: 2024-11-13 | Stop reason: HOSPADM

## 2024-11-13 RX ORDER — FENTANYL CITRATE 50 UG/ML
INJECTION, SOLUTION INTRAMUSCULAR; INTRAVENOUS AS NEEDED
Status: DISCONTINUED | OUTPATIENT
Start: 2024-11-13 | End: 2024-11-13 | Stop reason: SURG

## 2024-11-13 RX ORDER — ONDANSETRON 2 MG/ML
INJECTION INTRAMUSCULAR; INTRAVENOUS AS NEEDED
Status: DISCONTINUED | OUTPATIENT
Start: 2024-11-13 | End: 2024-11-13 | Stop reason: SURG

## 2024-11-13 RX ORDER — HYDRALAZINE HYDROCHLORIDE 20 MG/ML
5 INJECTION INTRAMUSCULAR; INTRAVENOUS
Status: DISCONTINUED | OUTPATIENT
Start: 2024-11-13 | End: 2024-11-13 | Stop reason: HOSPADM

## 2024-11-13 RX ORDER — SODIUM CHLORIDE 0.9 % (FLUSH) 0.9 %
3-10 SYRINGE (ML) INJECTION AS NEEDED
Status: DISCONTINUED | OUTPATIENT
Start: 2024-11-13 | End: 2024-11-13 | Stop reason: HOSPADM

## 2024-11-13 RX ORDER — LIDOCAINE HYDROCHLORIDE 20 MG/ML
INJECTION, SOLUTION INFILTRATION; PERINEURAL AS NEEDED
Status: DISCONTINUED | OUTPATIENT
Start: 2024-11-13 | End: 2024-11-13 | Stop reason: SURG

## 2024-11-13 RX ORDER — ROCURONIUM BROMIDE 10 MG/ML
INJECTION, SOLUTION INTRAVENOUS AS NEEDED
Status: DISCONTINUED | OUTPATIENT
Start: 2024-11-13 | End: 2024-11-13 | Stop reason: SURG

## 2024-11-13 RX ORDER — OXYCODONE AND ACETAMINOPHEN 7.5; 325 MG/1; MG/1
1 TABLET ORAL EVERY 4 HOURS PRN
Status: DISCONTINUED | OUTPATIENT
Start: 2024-11-13 | End: 2024-11-13 | Stop reason: HOSPADM

## 2024-11-13 RX ORDER — MIDAZOLAM HYDROCHLORIDE 1 MG/ML
0.5 INJECTION, SOLUTION INTRAMUSCULAR; INTRAVENOUS
Status: DISCONTINUED | OUTPATIENT
Start: 2024-11-13 | End: 2024-11-13 | Stop reason: HOSPADM

## 2024-11-13 RX ORDER — ONDANSETRON 2 MG/ML
4 INJECTION INTRAMUSCULAR; INTRAVENOUS ONCE AS NEEDED
Status: DISCONTINUED | OUTPATIENT
Start: 2024-11-13 | End: 2024-11-13 | Stop reason: HOSPADM

## 2024-11-13 RX ORDER — DOCUSATE SODIUM 100 MG/1
100 CAPSULE, LIQUID FILLED ORAL 2 TIMES DAILY
Qty: 60 CAPSULE | Refills: 0 | Status: SHIPPED | OUTPATIENT
Start: 2024-11-13

## 2024-11-13 RX ORDER — IPRATROPIUM BROMIDE AND ALBUTEROL SULFATE 2.5; .5 MG/3ML; MG/3ML
3 SOLUTION RESPIRATORY (INHALATION) ONCE AS NEEDED
Status: DISCONTINUED | OUTPATIENT
Start: 2024-11-13 | End: 2024-11-13 | Stop reason: HOSPADM

## 2024-11-13 RX ORDER — ROPIVACAINE HYDROCHLORIDE 5 MG/ML
INJECTION, SOLUTION EPIDURAL; INFILTRATION; PERINEURAL
Status: DISCONTINUED | OUTPATIENT
Start: 2024-11-13 | End: 2024-11-13 | Stop reason: SURG

## 2024-11-13 RX ORDER — DEXAMETHASONE SODIUM PHOSPHATE 4 MG/ML
INJECTION, SOLUTION INTRA-ARTICULAR; INTRALESIONAL; INTRAMUSCULAR; INTRAVENOUS; SOFT TISSUE
Status: DISCONTINUED | OUTPATIENT
Start: 2024-11-13 | End: 2024-11-13 | Stop reason: SURG

## 2024-11-13 RX ORDER — DIPHENHYDRAMINE HYDROCHLORIDE 50 MG/ML
12.5 INJECTION INTRAMUSCULAR; INTRAVENOUS
Status: DISCONTINUED | OUTPATIENT
Start: 2024-11-13 | End: 2024-11-13 | Stop reason: HOSPADM

## 2024-11-13 RX ORDER — FAMOTIDINE 10 MG/ML
20 INJECTION, SOLUTION INTRAVENOUS ONCE
Status: COMPLETED | OUTPATIENT
Start: 2024-11-13 | End: 2024-11-13

## 2024-11-13 RX ORDER — HYDROCODONE BITARTRATE AND ACETAMINOPHEN 7.5; 325 MG/1; MG/1
1 TABLET ORAL EVERY 4 HOURS PRN
Qty: 28 TABLET | Refills: 0 | Status: SHIPPED | OUTPATIENT
Start: 2024-11-13 | End: 2024-11-15 | Stop reason: SDUPTHER

## 2024-11-13 RX ORDER — FENTANYL CITRATE 50 UG/ML
50 INJECTION, SOLUTION INTRAMUSCULAR; INTRAVENOUS
Status: DISCONTINUED | OUTPATIENT
Start: 2024-11-13 | End: 2024-11-13 | Stop reason: HOSPADM

## 2024-11-13 RX ORDER — LABETALOL HYDROCHLORIDE 5 MG/ML
5 INJECTION, SOLUTION INTRAVENOUS
Status: DISCONTINUED | OUTPATIENT
Start: 2024-11-13 | End: 2024-11-13 | Stop reason: HOSPADM

## 2024-11-13 RX ORDER — SODIUM CHLORIDE, SODIUM LACTATE, POTASSIUM CHLORIDE, CALCIUM CHLORIDE 600; 310; 30; 20 MG/100ML; MG/100ML; MG/100ML; MG/100ML
9 INJECTION, SOLUTION INTRAVENOUS CONTINUOUS
Status: DISCONTINUED | OUTPATIENT
Start: 2024-11-13 | End: 2024-11-13 | Stop reason: HOSPADM

## 2024-11-13 RX ORDER — DROPERIDOL 2.5 MG/ML
0.62 INJECTION, SOLUTION INTRAMUSCULAR; INTRAVENOUS
Status: DISCONTINUED | OUTPATIENT
Start: 2024-11-13 | End: 2024-11-13 | Stop reason: HOSPADM

## 2024-11-13 RX ORDER — ONDANSETRON 4 MG/1
4 TABLET, FILM COATED ORAL EVERY 8 HOURS PRN
Qty: 10 TABLET | Refills: 0 | Status: SHIPPED | OUTPATIENT
Start: 2024-11-13

## 2024-11-13 RX ORDER — SODIUM CHLORIDE 0.9 % (FLUSH) 0.9 %
3 SYRINGE (ML) INJECTION EVERY 12 HOURS SCHEDULED
Status: DISCONTINUED | OUTPATIENT
Start: 2024-11-13 | End: 2024-11-13 | Stop reason: HOSPADM

## 2024-11-13 RX ORDER — EPHEDRINE SULFATE 50 MG/ML
5 INJECTION, SOLUTION INTRAVENOUS ONCE AS NEEDED
Status: DISCONTINUED | OUTPATIENT
Start: 2024-11-13 | End: 2024-11-13 | Stop reason: HOSPADM

## 2024-11-13 RX ORDER — FENTANYL CITRATE 50 UG/ML
50 INJECTION, SOLUTION INTRAMUSCULAR; INTRAVENOUS ONCE AS NEEDED
Status: DISCONTINUED | OUTPATIENT
Start: 2024-11-13 | End: 2024-11-13 | Stop reason: HOSPADM

## 2024-11-13 RX ORDER — LIDOCAINE HYDROCHLORIDE 10 MG/ML
0.5 INJECTION, SOLUTION INFILTRATION; PERINEURAL ONCE AS NEEDED
Status: DISCONTINUED | OUTPATIENT
Start: 2024-11-13 | End: 2024-11-13 | Stop reason: HOSPADM

## 2024-11-13 RX ORDER — HYDROCODONE BITARTRATE AND ACETAMINOPHEN 5; 325 MG/1; MG/1
1 TABLET ORAL ONCE AS NEEDED
Status: COMPLETED | OUTPATIENT
Start: 2024-11-13 | End: 2024-11-13

## 2024-11-13 RX ORDER — PROMETHAZINE HYDROCHLORIDE 25 MG/1
25 SUPPOSITORY RECTAL ONCE AS NEEDED
Status: DISCONTINUED | OUTPATIENT
Start: 2024-11-13 | End: 2024-11-13 | Stop reason: HOSPADM

## 2024-11-13 RX ORDER — FLUMAZENIL 0.1 MG/ML
0.2 INJECTION INTRAVENOUS AS NEEDED
Status: DISCONTINUED | OUTPATIENT
Start: 2024-11-13 | End: 2024-11-13 | Stop reason: HOSPADM

## 2024-11-13 RX ADMIN — MAGNESIUM SULFATE HEPTAHYDRATE 2 G: 500 INJECTION, SOLUTION INTRAMUSCULAR; INTRAVENOUS at 10:57

## 2024-11-13 RX ADMIN — FENTANYL CITRATE 50 MCG: 50 INJECTION, SOLUTION INTRAMUSCULAR; INTRAVENOUS at 09:45

## 2024-11-13 RX ADMIN — KETOROLAC TROMETHAMINE 30 MG: 30 INJECTION, SOLUTION INTRAMUSCULAR at 14:42

## 2024-11-13 RX ADMIN — FENTANYL CITRATE 50 MCG: 50 INJECTION, SOLUTION INTRAMUSCULAR; INTRAVENOUS at 12:33

## 2024-11-13 RX ADMIN — Medication 20 MG: at 11:10

## 2024-11-13 RX ADMIN — DEXAMETHASONE SODIUM PHOSPHATE 8 MG: 4 INJECTION, SOLUTION INTRAMUSCULAR; INTRAVENOUS at 10:00

## 2024-11-13 RX ADMIN — SCOPALAMINE 1 PATCH: 1 PATCH, EXTENDED RELEASE TRANSDERMAL at 08:38

## 2024-11-13 RX ADMIN — SODIUM CHLORIDE, POTASSIUM CHLORIDE, SODIUM LACTATE AND CALCIUM CHLORIDE 9 ML/HR: 600; 310; 30; 20 INJECTION, SOLUTION INTRAVENOUS at 08:29

## 2024-11-13 RX ADMIN — FENTANYL CITRATE 50 MCG: 50 INJECTION, SOLUTION INTRAMUSCULAR; INTRAVENOUS at 12:03

## 2024-11-13 RX ADMIN — HYDROMORPHONE HYDROCHLORIDE 0.5 MG: 1 INJECTION, SOLUTION INTRAMUSCULAR; INTRAVENOUS; SUBCUTANEOUS at 13:01

## 2024-11-13 RX ADMIN — HYDROMORPHONE HYDROCHLORIDE 0.5 MG: 1 INJECTION, SOLUTION INTRAMUSCULAR; INTRAVENOUS; SUBCUTANEOUS at 11:40

## 2024-11-13 RX ADMIN — HYDROMORPHONE HYDROCHLORIDE 0.5 MG: 1 INJECTION, SOLUTION INTRAMUSCULAR; INTRAVENOUS; SUBCUTANEOUS at 13:14

## 2024-11-13 RX ADMIN — LIDOCAINE HYDROCHLORIDE 60 MG: 20 INJECTION, SOLUTION INFILTRATION; PERINEURAL at 09:48

## 2024-11-13 RX ADMIN — DEXAMETHASONE SODIUM PHOSPHATE 4 MG: 4 INJECTION, SOLUTION INTRA-ARTICULAR; INTRALESIONAL; INTRAMUSCULAR; INTRAVENOUS; SOFT TISSUE at 12:37

## 2024-11-13 RX ADMIN — HYDROCODONE BITARTRATE AND ACETAMINOPHEN 1 TABLET: 5; 325 TABLET ORAL at 13:50

## 2024-11-13 RX ADMIN — FAMOTIDINE 20 MG: 10 INJECTION INTRAVENOUS at 08:37

## 2024-11-13 RX ADMIN — ROCURONIUM BROMIDE 80 MG: 10 INJECTION, SOLUTION INTRAVENOUS at 09:48

## 2024-11-13 RX ADMIN — ROPIVACAINE HYDROCHLORIDE 20 ML: 5 INJECTION EPIDURAL; INFILTRATION; PERINEURAL at 12:37

## 2024-11-13 RX ADMIN — SODIUM CHLORIDE 2000 MG: 900 INJECTION INTRAVENOUS at 09:40

## 2024-11-13 RX ADMIN — ONDANSETRON 4 MG: 2 INJECTION INTRAMUSCULAR; INTRAVENOUS at 11:28

## 2024-11-13 RX ADMIN — PROPOFOL 30 MG: 10 INJECTION, EMULSION INTRAVENOUS at 11:44

## 2024-11-13 RX ADMIN — Medication 30 MG: at 10:06

## 2024-11-13 RX ADMIN — FENTANYL CITRATE 50 MCG: 50 INJECTION, SOLUTION INTRAMUSCULAR; INTRAVENOUS at 12:51

## 2024-11-13 RX ADMIN — FENTANYL CITRATE 50 MCG: 50 INJECTION, SOLUTION INTRAMUSCULAR; INTRAVENOUS at 12:11

## 2024-11-13 RX ADMIN — SUGAMMADEX 200 MG: 100 INJECTION, SOLUTION INTRAVENOUS at 11:41

## 2024-11-13 RX ADMIN — OXYCODONE HYDROCHLORIDE AND ACETAMINOPHEN 1 TABLET: 7.5; 325 TABLET ORAL at 12:51

## 2024-11-13 RX ADMIN — Medication 3 ML: at 08:30

## 2024-11-13 RX ADMIN — PROPOFOL 220 MG: 10 INJECTION, EMULSION INTRAVENOUS at 09:48

## 2024-11-13 NOTE — ANESTHESIA PROCEDURE NOTES
Right adductor canal block      Patient reassessed immediately prior to procedure    Patient location during procedure: post-op  Start time: 11/13/2024 12:37 PM  Stop time: 11/13/2024 12:37 PM  Reason for block: at surgeon's request and post-op pain management  Performed by  Anesthesiologist: Erinn Escalante MD  Preanesthetic Checklist  Completed: patient identified, IV checked, site marked, risks and benefits discussed, surgical consent, monitors and equipment checked, pre-op evaluation and timeout performed  Prep:  Pt Position: sitting  Sterile barriers:cap, gloves and mask  Prep: ChloraPrep  Patient monitoring: blood pressure monitoring, continuous pulse oximetry and EKG  Procedure    Guidance:ultrasound guided    ULTRASOUND INTERPRETATION.  Using ultrasound guidance a 21 G gauge needle was placed in close proximity to the nerve, at which point, under ultrasound guidance anesthetic was injected in the area of the nerve and spread of the anesthesia was seen on ultrasound in close proximity thereto.  There were no abnormalities seen on ultrasound; a digital image was taken; and the patient tolerated the procedure with no complications. Images:still images obtained, printed/placed on chart    Laterality:right  Block Type:adductor canal block  Injection Technique:single-shot  Needle Type:short-bevel and echogenic  Needle Gauge:21 G  Resistance on Injection: none    Medications Used: dexamethasone (DECADRON) injection - Injection   4 mg - 11/13/2024 12:37:00 PM  ropivacaine (NAROPIN) 0.5 % injection - Injection   20 mL - 11/13/2024 12:37:00 PM      Medications  Comment:Ultrasound Interpretation:  Using ultrasound guidance the needle was placed in close proximity to the target nerve and anesthetic was injected in the area of the target nerve and/or bundles, and spread of the anesthetic was seen on ultrasound in close proximity thereto.  There were no abnormalities seen on ultrasound; a digital image was taken;  and the patient tolerated the procedure with no complications.    Block placed for postoperative pain control per surgeon request.       Post Assessment  Injection Assessment: negative aspiration for heme, no paresthesia on injection and incremental injection  Patient Tolerance:comfortable throughout block  Complications:no  Performed by: Erinn Escalante MD

## 2024-11-13 NOTE — H&P
H and P    Patient: David Robles    YOB: 1960    Medical Record Number: [id number]    Chief Complaints: Right quadriceps tendon rupture    History of Present Illness:     64 y.o. male patient who was recently seen in the office diagnosed with right quadriceps tendon rupture.  Patient sustained a fall last week was seen in the emergency department.  Imaging exam was confirmatory of right quadriceps tendon rupture.  Patient was placed in a brace total follow-up with orthopedics.  He is mostly moving to [place] but like to get this fixed while he is here and is willing to wait at least 6 to 8 weeks prior to moving to [place] and he will ensure proper therapy and orthopedic follow-up is established.  Patient does states he is on Xarelto last dose this morning.  Does see the heart center but states he does not see a cardiologist regularly.    Denies any numbness or tingling.  Denies any fevers, cough or shortness of breath.    Allergies: No Known Allergies    Home Medications:      Current Facility-Administered Medications:     ceFAZolin 2000 mg IVPB in 100 mL NS (MBP), 2,000 mg, Intravenous, Once, David Varela MD    fentaNYL citrate (PF) (SUBLIMAZE) injection 50 mcg, 50 mcg, Intravenous, Once PRN, Trevor White MD    lactated ringers infusion, 9 mL/hr, Intravenous, Continuous, Trevor White MD, Last Rate: 9 mL/hr at 11/13/24 0829, 9 mL/hr at 11/13/24 0829    lidocaine (XYLOCAINE) 1 % injection 0.5 mL, 0.5 mL, Intradermal, Once PRN, Trevor White MD    midazolam (VERSED) injection 0.5 mg, 0.5 mg, Intravenous, Q5 Min PRN, Trevor White MD    scopolamine patch 1 mg/72 hr, 1 patch, Transdermal, Once, Trevor White MD, 1 patch at 11/13/24 0838    sodium chloride 0.9 % flush 3 mL, 3 mL, Intravenous, Q12H, Trevor White MD, 3 mL at 11/13/24 0830    sodium chloride 0.9 % flush 3-10 mL, 3-10 mL, Intravenous, PRN, Trevor White MD    Past Medical History:   Diagnosis Date     Dizziness and giddiness     Dyspnea on exertion     Essential (primary) hypertension     Hyperglycemia     Hyperlipidemia     Hypertension     Insect bite     Mixed hyperlipidemia     Other seborrheic keratosis     Paroxysmal atrial fibrillation     PONV (postoperative nausea and vomiting)     Rupture of right quadriceps tendon 11/11/2024    Stroke     05/2023    Unspecified skin changes        Past Surgical History:   Procedure Laterality Date    HAND SURGERY Right     HAND SURGERY  2000    HERNIA REPAIR      HERNIA REPAIR  1974    again in 1995       Social History     Occupational History    Not on file   Tobacco Use    Smoking status: Never    Smokeless tobacco: Never   Vaping Use    Vaping status: Never Used   Substance and Sexual Activity    Alcohol use: Never    Drug use: Never    Sexual activity: Defer     Partners: Female      Social History     Social History Narrative    ** Merged History Encounter **            Family History   Problem Relation Age of Onset    Lymphoma Mother     Coronary artery disease Father     Throat cancer Father     Lymphoma Sister     Leukemia Sister     Diabetes Brother     Hyperlipidemia Daughter     Drug abuse Son     Hypertension Son     Stroke Paternal Grandmother     Diabetes Other     Stroke Other     Malig Hyperthermia Neg Hx        Review of Systems:      Constitutional: Denies fever, shaking or chills         All other pertinent positives and negatives as noted above in HPI.    Physical Exam: 64 y.o. male    Vitals:    11/13/24 0738   BP: 132/94   BP Location: Right arm   Patient Position: Sitting   Pulse: 67   Resp: 16   Temp: 98.2 °F (36.8 °C)   TempSrc: Oral   SpO2: 97%       General:  Patient is awake and alert.  Appears in no acute distress or discomfort.      Musculoskeletal/Extremities:    Right lower extremity examined some swelling about the knee palpable defect of the quad tendon.  Motor and sensory intact distally compartment soft compressible.          Radiology:       Previous x-ray and MRI reviewed to evaluate the patient's complaint/s.    Narrative & Impression   MRI RIGHT KNEE WITHOUT CONTRAST     HISTORY: Right knee injury with suspected quadriceps insertional  tear/avulsion.     TECHNIQUE:  MRI right knee includes axial and coronal PD fat-sat as well  as sagittal PD, T1, T2-weighted sequences.     COMPARISON: Right knee x-rays 11/07/2024.      FINDINGS: There is a complete quadriceps insertional tear/avulsion.  Posterior quadriceps tendon fibers are retracted up to 4 cm and anterior  quadriceps tendon fibers are retracted 2 cm. As demonstrated  radiographically, there is a thin linear 2 x 1 cm avulsed fragment  associated with the quadriceps tendon end 2 cm above the patella. There  is quadriceps retraction and there is surrounding soft tissue edema.  Knee joint effusion is present and there is extracapsular extension of  fluid through the tear. There is also patella baja and the patellar  tendon exhibits undulating configuration.     There is a small tear along the undersurface of the free edge of the  posterior horn lateral meniscus demonstrated on the sagittal PD weighted  sequence image 13. The medial meniscus is intact. Cruciate ligaments,  medial and lateral collateral ligament complexes are intact. Mild  patellofemoral cartilage thinning. Bone marrow signal within normal  limits. No focal medial or lateral compartment articular cartilage  defect is demonstrated.     IMPRESSION:  1. Complete quadriceps tendon insertional tear/avulsion with retraction  and surrounding edema/hemorrhage. Joint effusion with extracapsular  extension of fluid.  2. Small free edge tear along the undersurface of the posterior horn  lateral meniscus.  3. Mild patellofemoral arthritis.     This report was finalized on 11/8/2024 7:58 AM by Bud Salgado M.D  on Workstation: [id number]           No imaging for comparison.    Assessment: Right quadriceps tendon  rupture      Plan:      Discussed findings with the patient and his wife.  Surgical intervention is recommended.  We discussed options as this will be recommended to get addressed within the next few weeks.  They like to get surgery done here and would stay over the next 6 to 8 weeks and make sure they have proper PT and orthopedic follow-up upon moving to [place].  I discussed surgery in detail the patient including risk and benefits.  I did discuss risk and benefits with the patient with risk including but not limited to bleeding, infection, damage to nearby nerves, vessels, tendons, ligaments, continued pain, worsening pain, stiffness, fracture, dislocation, leg length discrepancy, blood clots, even death with anesthesia and possible need for future procedures surgeries.  Patient understood this and has chosen to proceed.         Will plan to get him scheduled for right quadriceps tendon repair    Hold Xarelto 48 hours prior.  Will restart postoperatively    Same-day/outpatient surgery      All questions were answered.  Patient understands and agrees with the plan.    David Varela MD    11/11/2024    CC to Alma Bernal MD    This note was copied and pasted from most recent office visit no additional updates after seeing the patient this morning.    David Varela MD

## 2024-11-13 NOTE — ANESTHESIA PREPROCEDURE EVALUATION
Anesthesia Evaluation     history of anesthetic complications:  PONV               Airway   Mallampati: II  TM distance: >3 FB  Neck ROM: full  Dental - normal exam     Pulmonary    (+) ,shortness of breath  (-) decreased breath sounds, wheezes  Cardiovascular - normal exam    (+) hypertension, dysrhythmias, hyperlipidemia      Neuro/Psych  (+) CVA, dizziness/light headedness  GI/Hepatic/Renal/Endo      Musculoskeletal     Abdominal    Substance History      OB/GYN          Other                      Anesthesia Plan    ASA 3     general     intravenous induction     Anesthetic plan, risks, benefits, and alternatives have been provided, discussed and informed consent has been obtained with: patient.      CODE STATUS:

## 2024-11-13 NOTE — ANESTHESIA POSTPROCEDURE EVALUATION
Patient: David Robles    Procedure Summary       Date: 11/13/24 Room / Location: [facility] OR [address] [place] [address] [facility]    Anesthesia Start: 0943 Anesthesia Stop: 1200    Procedure: Right QUADRICEPS TENDON REPAIR (Right: Thigh) Diagnosis:       Rupture of right quadriceps tendon, initial encounter      (Rupture of right quadriceps tendon, initial encounter [S76.111A])    Surgeons: David Varela MD Provider: Erinn Escalante MD    Anesthesia Type: general ASA Status: 3            Anesthesia Type: general    Vitals  Vitals Value Taken Time   /83 11/13/24 1330   Temp 36.8 °C (98.2 °F) 11/13/24 1158   Pulse 75 11/13/24 1335   Resp 16 11/13/24 1330   SpO2 100 % 11/13/24 1335   Vitals shown include unfiled device data.        Post Anesthesia Care and Evaluation    Patient location during evaluation: bedside  Patient participation: complete - patient participated  Level of consciousness: awake  Pain management: adequate    Airway patency: patent  Anesthetic complications: No anesthetic complications  PONV Status: controlled  Cardiovascular status: acceptable  Respiratory status: acceptable  Hydration status: acceptable    Comments: --------------------            11/13/24               1343     --------------------   BP:       143/79     Pulse:      69       Resp:                Temp:                SpO2:      100%     --------------------

## 2024-11-13 NOTE — OP NOTE
Name: David Robles  YOB: 1960    DATE OF SURGERY: 11/13/2024    PREOPERATIVE DIAGNOSIS:    right   Quad Tendon Rupture    POSTOPERATIVE DIAGNOSIS:      right   Quad Tendon Rupture    PROCEDURE PERFORMED:    right     Quad Tendon Repair    SURGEON: David Varela M.D.    ASSISTANT: SYDNIE NESBITT    IMPLANTS:  Implant Name Type Inv. Item Serial No.  Lot No. LRB No. Used Action   SUT FIBERTAPE TW 2 7IN WHT/BLK [id number] - [id number] Implant SUT FIBERTAPE TW 2 7IN WHT/BLK [id number]  [employer] [id number] Right 1 Implanted   SUT FIBERTAPE TW 2 7IN WHT/BLK [id number] - [id number] Implant SUT FIBERTAPE TW 2 7IN WHT/BLK [id number]  [employer] [id number] Right 1 Implanted   SUT TAPE W/TPR END 1/2 CIR TPR NDL 1.3MM 36IN - [id number] Implant SUT TAPE W/TPR END 1/2 CIR TPR NDL 1.3MM 36IN  [employer] [id number] Right 1 Implanted   DEV CONTRL TISS STRATAFIX SPIRAL MNCRYL UD 3/0 PLS 30CM - [id number] Implant DEV CONTRL TISS STRATAFIX SPIRAL MNCRYL UD 3/0 PLS 30CM  [employer]  DIV [employer] J [employer] J [id number] Right 1 Implanted       Anesthesia: general with possible post op pain block  Estimated Blood Loss: 100cc  Specimens : none  Complications: none    Indications for procedure: This is a pleasant 64-year-old male who sustained a fall resulting in a quadriceps tendon rupture.  Surgical intervention was recommended.  We discussed surgical repair of the quadriceps tendon risk and benefits were discussed with the patient including but not limited to bleeding, infection, stiffness, nonhealing, fracture, even death he is anesthesia possible need for future procedures surgeries.  Patient understood and chose to proceed.    DESCRIPTION OF PROCEDURE:     The patient was taken to the operating room and placed in the supine position.  A sequential compression device was carefully placed on the non-operative leg. Preoperative antibiotics were administered.  After adequate induction of anesthesia a thigh tourniquet was placed to  the right   thigh.  The right    lower extremity was prepped and draped in the normal sterile fashion.     Timeout was then performed verifying correct patient, surgical site, surgical procedure, antibiotics, ASA classification, and fire risk, all in the room agreed.  Attention was paid to the right   lower extremity a straight midline incision was made over the anterior aspect of the knee.  Skin was incised sharply with a knife.  Bovie was used to coagulate superficial bleeders.  Careful dissection was performed down to the quadriceps tendon.  The tear was identified.  We then continued our dissection distally identifying the patella as well as the retinaculum.  The knee was exposed properly.  The retinaculum was shown to have some tearing as well both medially and laterally.  Overall the tissues were traumatized due to injury.  We carefully debrided the quad tendon back to healthy-appearing tissue.  We also debrided the superior aspect of the patella down to the bone.  The bone then was cleaned off with a rongeur and curette and bony bleeding was seen.  We then used FiberTape and a free needle. 2 sutures were place into the quad tendon using a Krackow type stitch. The first suture was placed laterally and ran proximally and then back down distally in the same manner but on the medial aspect of the tendon  and the 2 ends were tagged.  A second suture was used again Krackow stitching was performed.  The suture had good bite and hold on the quadriceps tendon.  We then made 3 drill holes for interosseous suture passing in the patella.  We used a 2.0 drill bit and  3 holes were drilled with care to not violate the undersurface of the patella.  We used a Maxwell suture passer and a free suture to shuttle sutures.  Small incisions were made in the patella tendon to pass the suture out of the inferior pole.  We pulled the lateralmost suture through the lateralmost interosseous tunnel, we then pulled the medial strand of  the lateral suture and the lateral strand of the medial suture through the central interosseous tunnel.  We then passed our final medial strand through the most medial interosseous tunnel.  The strands from the medial most suture were clamped and the suture ends from the lateral suture were clamped separately.  We then used copious amounts of normal sterile saline and 1 g of Kefzol mixed to irrigate out the knee joint.  We then put the knee in a fully extended position with a bump under the heel.  We then used our medialmost suture and tied limbs together followed by tying the lateral suture ends together.  Once the sutures were tied in place the quadriceps tendon seemed to be well approximated to the superior pole of the patella.  After this we used 0 Vicryl to reapproximate the retinaculum in a running fashion.  Once this was done the repair was tested and the knee was able to be flexed to 60 degrees with proper tension maintained.  The repair was solid and stable.  We again irrigated the wound.   We then used the 2-0 and 3-0 sutures were used to close subcutaneously.  Staples were used on the skin.  There was excellent hemostasis.  Incision was dressed with xerofrom, 4 x 4's and soft web roll.  We then used a 3 inch and 4 inch Ace wrap from the foot up to the thigh.  A knee immobilizer was then placed to keep the knee in full extension. At the end of the case, the sponge and needle counts were reported as being correct. There were no known complications.  The patient tolerated procedure well.  The patient was then transported to the recovery room with vital signs stable.        David Varela M.D.  11/13/2024

## 2024-11-13 NOTE — PERIOPERATIVE NURSING NOTE
Obtain telephone consent for adductor nerve block from Elizabeth Robles (spouse). Elizabeth very familiar with nerve block, she mentioned nerve block prior to RN verbalizing it. Per spouse she had a block with her ankle surgery. Spouse agreeable with nerve block

## 2024-11-13 NOTE — TELEPHONE ENCOUNTER
Patients wife called informing us that a prior auth was needed for Hydrocodone 7.5-325 that was prescribed today post op. I advised her that I would call patients insurance to do the PA and let her know that she may have to pay out of pocket for a couple of days worth until the insurance has a determination.     PA submitted as URGENT REQUEST

## 2024-11-13 NOTE — ANESTHESIA PROCEDURE NOTES
Airway  Urgency: elective    Date/Time: 11/13/2024 9:51 AM  Airway not difficult    General Information and Staff    Patient location during procedure: OR  Anesthesiologist: Erinn Escalante MD  CRNA/CAA: Dora Lambert CRNA    Indications and Patient Condition  Indications for airway management: airway protection    Preoxygenated: yes  MILS maintained throughout  Mask difficulty assessment: 1 - vent by mask    Final Airway Details  Final airway type: endotracheal airway      Successful airway: ETT  Cuffed: yes   Successful intubation technique: direct laryngoscopy  Facilitating devices/methods: intubating stylet  Endotracheal tube insertion site: oral  Blade: Aceves  Blade size: 2  ETT size (mm): 7.5  Cormack-Lehane Classification: grade I - full view of glottis  Placement verified by: chest auscultation and capnometry   Cuff volume (mL): 8  Measured from: lips  Number of attempts at approach: 1  Assessment: lips, teeth, and gum same as pre-op and atraumatic intubation    Additional Comments  Pt preoxygenated, SIVI, bag mask vent, ATETI, dentition as before

## 2024-11-15 DIAGNOSIS — S76.111A RUPTURE OF RIGHT QUADRICEPS TENDON, INITIAL ENCOUNTER: ICD-10-CM

## 2024-11-15 NOTE — TELEPHONE ENCOUNTER
Surgery: 11/13/24, Right QUADRICEPS TENDON REPAIR     Last refill: 11/13/24    Next visit: 11/25/24

## 2024-11-15 NOTE — TELEPHONE ENCOUNTER
Caller: MISSY    Relationship: SELF    Best call back number: [phone] (home)       Requested Prescriptions:   Requested Prescriptions     Pending Prescriptions Disp Refills    HYDROcodone-acetaminophen (NORCO) 7.5-325 MG per tablet 28 tablet 0     Sig: Take 1 tablet by mouth Every 4 (Four) Hours As Needed for Moderate Pain or Mild Pain (Pain). May take 2 tablets every 4 hours for severe pain if needed        Pharmacy where request should be sent: [facility]/PHARMACY #[id number] [address] [phone] [facility] [phone]      Last office visit with prescribing clinician: 11/11/2024   Last telemedicine visit with prescribing clinician: Visit date not found   Next office visit with prescribing clinician: 11/25/2024     Additional details provided by patient:     Does the patient have less than a 3 day supply:  [x] Yes  [] No    Would you like a call back once the refill request has been completed: [x] Yes [] No    If the office needs to give you a call back, can they leave a voicemail: [x] Yes [] No    Wendy Olson, PATY   11/15/24 14:03 EST

## 2024-11-18 RX ORDER — HYDROCODONE BITARTRATE AND ACETAMINOPHEN 7.5; 325 MG/1; MG/1
1 TABLET ORAL EVERY 4 HOURS PRN
Qty: 28 TABLET | Refills: 0 | Status: SHIPPED | OUTPATIENT
Start: 2024-11-18

## 2024-11-25 ENCOUNTER — OFFICE VISIT (OUTPATIENT)
Dept: ORTHOPEDIC SURGERY | Facility: CLINIC | Age: 64
End: 2024-11-25
Payer: COMMERCIAL

## 2024-11-25 VITALS — BODY MASS INDEX: 27.17 KG/M2 | WEIGHT: 205 LBS | HEIGHT: 73 IN | TEMPERATURE: 98.7 F

## 2024-11-25 DIAGNOSIS — S76.111D RUPTURE OF RIGHT QUADRICEPS MUSCLE, SUBSEQUENT ENCOUNTER: Primary | ICD-10-CM

## 2024-11-25 PROCEDURE — 99024 POSTOP FOLLOW-UP VISIT: CPT | Performed by: ORTHOPAEDIC SURGERY

## 2024-11-25 NOTE — PROGRESS NOTES
Patient: David Robles  YOB: 1960 64 y.o. male  Medical Record Number: [id number]    Chief Complaints:   Chief Complaint   Patient presents with    Right Thigh - Post-op       History of Present Illness:David Robles is a 64 y.o. male who presents for follow-up of right quadriceps tendon repair.  He is now 2 weeks out.  States has been adhering to restrictions.    Allergies: No Known Allergies    Medications:   Current Outpatient Medications   Medication Sig Dispense Refill    atorvastatin (LIPITOR) 20 MG tablet Take 1 tablet by mouth Every Evening. Indications: High Amount of Fats in the Blood 90 tablet 3    hydroCHLOROthiazide 25 MG tablet TAKE 1 TABLET BY MOUTH EVERY DAY 90 tablet 1    HYDROcodone-acetaminophen (NORCO) 7.5-325 MG per tablet Take 1 tablet by mouth Every 4 (Four) Hours As Needed for Moderate Pain or Mild Pain (Pain). May take 2 tablets every 4 hours for severe pain if needed 28 tablet 0    Omega-3 Fatty Acids (fish oil) 1000 MG capsule capsule Take 3 capsules by mouth 2 (Two) Times a Day With Meals.      valsartan (DIOVAN) 160 MG tablet TAKE 1 TABLET BY MOUTH EVERY DAY 90 tablet 3    Xarelto 20 MG tablet Take 1 tablet by mouth Daily With Dinner. LD 11/11 am.      docusate sodium (COLACE) 100 MG capsule Take 1 capsule by mouth 2 (Two) Times a Day. (Patient not taking: Reported on 11/25/2024) 60 capsule 0    ondansetron (Zofran) 4 MG tablet Take 1 tablet by mouth Every 8 (Eight) Hours As Needed for Nausea or Vomiting. (Patient not taking: Reported on 11/25/2024) 10 tablet 0     No current facility-administered medications for this visit.         The following portions of the patient's history were reviewed and updated as appropriate: allergies, current medications, past family history, past medical history, past social history, past surgical history and problem list.    Review of Systems:   A 14 point review of systems was performed. All systems negative except pertinent  "positives/negative listed in HPI above    Physical Exam:   Vitals:    11/25/24 1106   Temp: 98.7 °F (37.1 °C)   TempSrc: Temporal   Weight: 93 kg (205 lb)   Height: 185.4 cm (72.99\")   PainSc:   1   PainLoc: Leg       General: A and O x 3, ASA, NAD    SCLERA:    Normal    DENTITION:   Normal    Right knee evaluated incision is healing well.  Minimal swelling.  Motor and sensory intact distally.  Compartment soft compressible.        Assessment/Plan:  2-week status post right quad tendon repair    I discussed the findings with the patient and he can keep the Steri-Strips on over the next 7 to 10 days okay to shower no soaking in a tub.  No lotion or ointments on the incision but around the knees okay.  Continue knee extension at all times.  Remove the brace for showering and throughout the day for skin checks and icing but keep leg in extension.  Okay for protected weightbearing with the brace on locked in extension and walking aid.  Continue to ice and elevate.  Patient started therapy here in about a week.  Therapy instruction given to the patient to get to therapy.  Patient will follow-up in 4 weeks.  If there is any changes or questions he will let us know prior.  Questions answered  Answers submitted by the patient for this visit:  Primary Reason for Visit (Submitted on 11/20/2024)  What is the primary reason for your visit?: Problem Not Listed  Problem not listed (Submitted on 11/20/2024)  Chief Complaint: Other medical problem  abdominal pain: No  anorexia: No  joint pain: Yes  change in stool: No  chest pain: No  chills: No  nasal congestion: No  cough: No  diaphoresis: No  fatigue: No  fever: No  headaches: No  joint swelling: Yes  myalgias: Yes  nausea: No  neck pain: No  numbness: No  rash: No  sore throat: No  swollen glands: No  dysuria: No  vertigo: No  visual change: No  vomiting: No  weakness: No  Onset: 1 to 6 months    "

## 2024-12-10 ENCOUNTER — OFFICE VISIT (OUTPATIENT)
Dept: ORTHOPEDIC SURGERY | Facility: CLINIC | Age: 64
End: 2024-12-10
Payer: COMMERCIAL

## 2024-12-10 ENCOUNTER — OFFICE VISIT (OUTPATIENT)
Dept: FAMILY MEDICINE CLINIC | Facility: CLINIC | Age: 64
End: 2024-12-10
Payer: COMMERCIAL

## 2024-12-10 ENCOUNTER — TELEPHONE (OUTPATIENT)
Dept: FAMILY MEDICINE CLINIC | Facility: CLINIC | Age: 64
End: 2024-12-10

## 2024-12-10 VITALS — WEIGHT: 205 LBS | HEIGHT: 73 IN | BODY MASS INDEX: 27.17 KG/M2

## 2024-12-10 VITALS
OXYGEN SATURATION: 97 % | DIASTOLIC BLOOD PRESSURE: 76 MMHG | HEART RATE: 71 BPM | SYSTOLIC BLOOD PRESSURE: 116 MMHG | HEIGHT: 73 IN | BODY MASS INDEX: 27.05 KG/M2

## 2024-12-10 DIAGNOSIS — Z11.59 NEED FOR HEPATITIS C SCREENING TEST: ICD-10-CM

## 2024-12-10 DIAGNOSIS — R73.9 HYPERGLYCEMIA: ICD-10-CM

## 2024-12-10 DIAGNOSIS — I10 PRIMARY HYPERTENSION: ICD-10-CM

## 2024-12-10 DIAGNOSIS — Z12.5 SCREENING PSA (PROSTATE SPECIFIC ANTIGEN): ICD-10-CM

## 2024-12-10 DIAGNOSIS — Z00.00 WELL ADULT EXAM: Primary | ICD-10-CM

## 2024-12-10 DIAGNOSIS — I48.0 PAROXYSMAL ATRIAL FIBRILLATION: ICD-10-CM

## 2024-12-10 DIAGNOSIS — E78.2 MIXED HYPERLIPIDEMIA: ICD-10-CM

## 2024-12-10 DIAGNOSIS — S76.111D RUPTURE OF RIGHT QUADRICEPS TENDON, SUBSEQUENT ENCOUNTER: Primary | ICD-10-CM

## 2024-12-10 DIAGNOSIS — Z79.01 CHRONIC ANTICOAGULATION: ICD-10-CM

## 2024-12-10 DIAGNOSIS — Z86.73 HISTORY OF CVA (CEREBROVASCULAR ACCIDENT): ICD-10-CM

## 2024-12-10 PROCEDURE — 99397 PER PM REEVAL EST PAT 65+ YR: CPT | Performed by: FAMILY MEDICINE

## 2024-12-10 PROCEDURE — 99024 POSTOP FOLLOW-UP VISIT: CPT | Performed by: ORTHOPAEDIC SURGERY

## 2024-12-10 RX ORDER — VALSARTAN 160 MG/1
160 TABLET ORAL DAILY
Qty: 90 TABLET | Refills: 1 | Status: SHIPPED | OUTPATIENT
Start: 2024-12-10

## 2024-12-10 RX ORDER — ATORVASTATIN CALCIUM 20 MG/1
20 TABLET, FILM COATED ORAL EVERY EVENING
Qty: 90 TABLET | Refills: 1 | Status: SHIPPED | OUTPATIENT
Start: 2024-12-10

## 2024-12-10 RX ORDER — HYDROCHLOROTHIAZIDE 25 MG/1
25 TABLET ORAL DAILY
Qty: 90 TABLET | Refills: 1 | Status: SHIPPED | OUTPATIENT
Start: 2024-12-10

## 2024-12-10 NOTE — PROGRESS NOTES
Chief Complaint  Annual Exam    Subjective    History of Present Illness {  Problem List  Visit  Diagnosis   Encounters  Notes  Medications  Labs  Result Review Imaging  Media :23}     David Robles presents to [facility] for Annual Exam.  He is . He is an  and in the process of moving to [place] for work. He has 5 biologic children and 4 stepchildren, scattered. His last colonoscopy was more than 10 years ago and is aware that he is due. He will schedule after relocating. His last PSA was 1 year ago and was normal at 0.6. He has never been a smoker. Prior to injuring his knee he was exercising daily--a combination of aerobic and strength training. He is up to date on routine dental and eye exams.     Their chronic medical conditions were reviewed and are stable unless noted otherwise below.  He has a history of hypertension. He is stable on valsartan 160mg and hydrochlorothiazide 25mg daily. His blood pressure has been well controlled. He denies any headaches, dizziness, or chest pain.     He is also here for follow up of hyperlipidemia. He is on atorvastatin 20mg daily. He reports good compliance and tolerability of the medication.     He also has a history of prediabetes. He controls with diet and exercise and his last A1C was 6.2.     He has a history of paroxysmal atrial fibrillation. He has a history of prior stroke and is now on xarelto for stroke prevention. He sees cardiologist and has had 2 known episodes since his CVA 5/2023. His last flair was 3-4 months ago and was taking cold medication at the time. He now avoids decongestants and caffeine.    He also has recent history of right quadriceps tendon repair. He tore it about a month ago due to mechanical fall going down steps carrying a box. He had surgery 4 weeks ago and has follow up with marian, Dr. Varela, later today.       History of Present Illness     Social History     Socioeconomic  "History    Marital status:    Tobacco Use    Smoking status: Never    Smokeless tobacco: Never   Vaping Use    Vaping status: Never Used   Substance and Sexual Activity    Alcohol use: Never    Drug use: Never    Sexual activity: Yes     Partners: Female     Birth control/protection: Vasectomy        Review of Systems   Constitutional:  Negative for fatigue.   HENT:  Negative for ear pain and sore throat.    Eyes:  Negative for pain.   Respiratory:  Negative for cough and shortness of breath.    Cardiovascular:  Negative for chest pain and palpitations.   Gastrointestinal:  Negative for abdominal pain, constipation and diarrhea.   Genitourinary:  Negative for dysuria.   Musculoskeletal:  Negative for myalgias.   Skin:  Negative for color change.   Allergic/Immunologic: Positive for environmental allergies (seasonal and takes medication as needed).   Neurological:  Negative for dizziness and headaches.   Psychiatric/Behavioral:  Negative for dysphoric mood and suicidal ideas. The patient is not nervous/anxious.         Objective       Vital Signs:   /76   Pulse 71   Ht 185.4 cm (73\")   SpO2 97%   BMI 27.05 kg/m²     Body mass index is 27.05 kg/m².     PHQ-9 Depression Screening  Little interest or pleasure in doing things?     Feeling down, depressed, or hopeless?     PHQ-2 Total Score     Trouble falling or staying asleep, or sleeping too much?     Feeling tired or having little energy?     Poor appetite or overeating?     Feeling bad about yourself - or that you are a failure or have let yourself or your family down?     Trouble concentrating on things, such as reading the newspaper or watching television?     Moving or speaking so slowly that other people could have noticed? Or the opposite - being so fidgety or restless that you have been moving around a lot more than usual?     Thoughts that you would be better off dead, or of hurting yourself in some way?     PHQ-9 Total Score     If you " checked off any problems, how difficult have these problems made it for you to do your work, take care of things at home, or get along with other people?        Physical Exam  Constitutional:       General: He is not in acute distress.  HENT:      Head: Normocephalic and atraumatic.      Nose: No rhinorrhea.      Mouth/Throat:      Mouth: Mucous membranes are moist.   Eyes:      Conjunctiva/sclera: Conjunctivae normal.   Cardiovascular:      Rate and Rhythm: Normal rate and regular rhythm.      Heart sounds: No murmur heard.  Pulmonary:      Effort: No respiratory distress.      Breath sounds: Normal breath sounds.   Abdominal:      General: There is no distension.      Palpations: Abdomen is soft.      Tenderness: There is no abdominal tenderness.   Musculoskeletal:      Right lower leg: Edema (trace) present.      Left lower leg: No edema.   Lymphadenopathy:      Cervical: No cervical adenopathy.   Skin:     Findings: No rash.   Neurological:      General: No focal deficit present.      Mental Status: He is alert.   Psychiatric:         Behavior: Behavior normal.          Result Review  Data Reviewed:{ Labs  Result Review  Imaging  Med Tab  Media :23}                Assessment and Plan {CC Problem List  Visit Diagnosis  ROS  Review (Popup)  Health Maintenance  Quality  BestPractice  Medications  SmartSets  SnapShot Encounters  Media :23}   Diagnoses and all orders for this visit:    1. Well adult exam (Primary)  -     CBC & Differential  -     Comprehensive Metabolic Panel  -     Lipid Panel  -     TSH Rfx On Abnormal To Free T4    2. Primary hypertension  -     Comprehensive Metabolic Panel    3. Mixed hyperlipidemia  -     Lipid Panel    4. Hyperglycemia  -     Comprehensive Metabolic Panel  -     Hemoglobin A1c    5. Paroxysmal atrial fibrillation    6. History of CVA (cerebrovascular accident)    7. Screening PSA (prostate specific antigen)  -     PSA Screen    8. Chronic anticoagulation  -      CBC & Differential    9. Need for hepatitis C screening test  -     Hepatitis C Antibody    Other orders  -     atorvastatin (LIPITOR) 20 MG tablet; Take 1 tablet by mouth Every Evening. Indications: High Amount of Fats in the Blood  Dispense: 90 tablet; Refill: 1  -     hydroCHLOROthiazide 25 MG tablet; Take 1 tablet by mouth Daily.  Dispense: 90 tablet; Refill: 1  -     valsartan (DIOVAN) 160 MG tablet; Take 1 tablet by mouth Daily.  Dispense: 90 tablet; Refill: 1        Patient Instructions   Continue your current medications.   Once released from ortho, gradually increase to target of 150 minutes of aerobic exercise weekly.  You had lab tests today. You should receive a call or my chart message with your test results. If you have not received your results in the next 7-10 days, please contact the office.    Follow up with ortho as scheduled for your knee.  Colonoscopy is due. I encourage you to schedule this as soon as you can when you get to [place].  I also encourage you to schedule with PCP soon after locating to [place] and definitely within the next 6 months.    Health maintenance, immunizations, and cancer screenings were discussed and encouraged.  In particular flu and pneumonia were strongly encouraged but he would like to wait and think about these and can return here or go to the pharmacy in the future if he decides he would like them.    Patient was given instructions and counseling regarding his condition or for health maintenance advice on the AVS.       No follow-ups on file.    Alma Bernal MD

## 2024-12-10 NOTE — PROGRESS NOTES
"Patient: David Robles  YOB: 1960 64 y.o. male  Medical Record Number: [id number]    Chief Complaints:   Chief Complaint   Patient presents with    Right Knee - Post-op       History of Present Illness:David Robles is a 64 y.o. male who presents for follow-up of right quad tendon tear.  Patient underwent operative surgical primary repair 4 weeks ago.  States he has been doing well swelling is down.  He supposed to be starting therapy here in the next week as he had not heard from anybody and just yet.  He is looking to start therapy in [facility] but plans to move to [place] here in the next 1 to 2 weeks.  He has started getting arrangements made for follow-up there in regards to PT and orthopedics.    Allergies: No Known Allergies    Medications:   Current Outpatient Medications   Medication Sig Dispense Refill    atorvastatin (LIPITOR) 20 MG tablet Take 1 tablet by mouth Every Evening. Indications: High Amount of Fats in the Blood 90 tablet 1    hydroCHLOROthiazide 25 MG tablet Take 1 tablet by mouth Daily. 90 tablet 1    Omega-3 Fatty Acids (fish oil) 1000 MG capsule capsule Take 3 capsules by mouth 2 (Two) Times a Day With Meals.      valsartan (DIOVAN) 160 MG tablet Take 1 tablet by mouth Daily. 90 tablet 1    Xarelto 20 MG tablet Take 1 tablet by mouth Daily With Dinner. LD 11/11 am.       No current facility-administered medications for this visit.         The following portions of the patient's history were reviewed and updated as appropriate: allergies, current medications, past family history, past medical history, past social history, past surgical history and problem list.    Review of Systems:   A 14 point review of systems was performed. All systems negative except pertinent positives/negative listed in HPI above    Physical Exam:   Vitals:    12/10/24 0916   Weight: 93 kg (205 lb)   Height: 185.4 cm (73\")   PainSc: 0-No pain   PainLoc: Knee       General: A and O x 3, ASA, NAD    SCLERA:    " Normal    DENTITION:   Normal  Right knee exam incision is healing well swelling is improved.  Patient does have some quad weakness but distally motor and sensory intact.  Compartment soft compressible.        Assessment/Plan:  4 week status post right quad tendon repair    Patient is doing well incision is healing swelling is down.  I like to get him started with some therapy he had trouble getting in over the past week or so.  Will print a therapy order today he was planning to initiate that while he is here in [facility].  He reports that he still has the instructions/PT protocol to give this therapist.  He is looking to make his move to [place] here in the next few weeks.  He is beginning to make arrangements for follow-up there with PT and orthopedics as noted previously.  We will supply him with his notes from the office as well as op report.  There is any issues or needs any additional information from us we are happy to supply him with that.  At this time if he still in town I like to see him back in the next 3 to 4 weeks however if he is already made his move disorder make sure he has appropriate follow-up and is progressing appropriately.  Patient is weightbearing as tolerated to right lower extremity with use of the brace on.  He will follow the PT protocol in which she will begin working on flexion and quad strengthening.  I told him during the day he can take off the brace when at rest just keep the leg in extension but can work on some gentle heel slides for knee flexion.  Continue to ice and elevate.  Patient understanding of this plan.  All questions answered.  Answers submitted by the patient for this visit:  Post Operative Visit (Submitted on 12/3/2024)  Chief Complaint: Follow-up  Pain Control: not requiring pain medication  Fever: no fever  Diet: adequate intake  Activity: Severely limited  Operative Site Issues: No

## 2024-12-10 NOTE — PATIENT INSTRUCTIONS
Continue your current medications.   Once released from ortho, gradually increase to target of 150 minutes of aerobic exercise weekly.  You had lab tests today. You should receive a call or my chart message with your test results. If you have not received your results in the next 7-10 days, please contact the office.    Follow up with ortho as scheduled for your knee.  Colonoscopy is due. I encourage you to schedule this as soon as you can when you get to [place].  I also encourage you to schedule with PCP soon after locating to [place] and definitely within the next 6 months.

## 2024-12-10 NOTE — TELEPHONE ENCOUNTER
Caller: David Robles    Relationship: Self    Best call back number: [phone]     What was the call regarding: PT STATES THAT VALSARTAN AND ATORVASTATIN REFILLS ARE ON HOLD AT [facility] AND AWAITING PHYSICIAN CONTACT

## 2024-12-11 ENCOUNTER — TREATMENT (OUTPATIENT)
Dept: PHYSICAL THERAPY | Facility: CLINIC | Age: 64
End: 2024-12-11
Payer: COMMERCIAL

## 2024-12-11 DIAGNOSIS — S76.111A QUADRICEPS TENDON RUPTURE, RIGHT, INITIAL ENCOUNTER: Primary | ICD-10-CM

## 2024-12-11 DIAGNOSIS — R26.2 DIFFICULTY WALKING: ICD-10-CM

## 2024-12-11 DIAGNOSIS — M25.561 ACUTE PAIN OF RIGHT KNEE: ICD-10-CM

## 2024-12-11 LAB
ALBUMIN SERPL-MCNC: 4.5 G/DL (ref 3.9–4.9)
ALP SERPL-CCNC: 75 IU/L (ref 44–121)
ALT SERPL-CCNC: 19 IU/L (ref 0–44)
AST SERPL-CCNC: 17 IU/L (ref 0–40)
BASOPHILS # BLD AUTO: 0.1 X10E3/UL (ref 0–0.2)
BASOPHILS NFR BLD AUTO: 1 %
BILIRUB SERPL-MCNC: 1.1 MG/DL (ref 0–1.2)
BUN SERPL-MCNC: 19 MG/DL (ref 8–27)
BUN/CREAT SERPL: 20 (ref 10–24)
CALCIUM SERPL-MCNC: 9.7 MG/DL (ref 8.6–10.2)
CHLORIDE SERPL-SCNC: 103 MMOL/L (ref 96–106)
CHOLEST SERPL-MCNC: 153 MG/DL (ref 100–199)
CO2 SERPL-SCNC: 22 MMOL/L (ref 20–29)
CREAT SERPL-MCNC: 0.94 MG/DL (ref 0.76–1.27)
EGFRCR SERPLBLD CKD-EPI 2021: 89 ML/MIN/1.73
EOSINOPHIL # BLD AUTO: 0.2 X10E3/UL (ref 0–0.4)
EOSINOPHIL NFR BLD AUTO: 3 %
ERYTHROCYTE [DISTWIDTH] IN BLOOD BY AUTOMATED COUNT: 13 % (ref 11.6–15.4)
GLOBULIN SER CALC-MCNC: 2.4 G/DL (ref 1.5–4.5)
GLUCOSE SERPL-MCNC: 112 MG/DL (ref 70–99)
HBA1C MFR BLD: 6.1 % (ref 4.8–5.6)
HCT VFR BLD AUTO: 44.3 % (ref 37.5–51)
HCV IGG SERPL QL IA: NON REACTIVE
HDLC SERPL-MCNC: 47 MG/DL
HGB BLD-MCNC: 14.6 G/DL (ref 13–17.7)
IMM GRANULOCYTES # BLD AUTO: 0 X10E3/UL (ref 0–0.1)
IMM GRANULOCYTES NFR BLD AUTO: 0 %
LDLC SERPL CALC-MCNC: 72 MG/DL (ref 0–99)
LYMPHOCYTES # BLD AUTO: 2.7 X10E3/UL (ref 0.7–3.1)
LYMPHOCYTES NFR BLD AUTO: 35 %
MCH RBC QN AUTO: 31.1 PG (ref 26.6–33)
MCHC RBC AUTO-ENTMCNC: 33 G/DL (ref 31.5–35.7)
MCV RBC AUTO: 94 FL (ref 79–97)
MONOCYTES # BLD AUTO: 0.7 X10E3/UL (ref 0.1–0.9)
MONOCYTES NFR BLD AUTO: 9 %
NEUTROPHILS # BLD AUTO: 4 X10E3/UL (ref 1.4–7)
NEUTROPHILS NFR BLD AUTO: 52 %
PLATELET # BLD AUTO: 226 X10E3/UL (ref 150–450)
POTASSIUM SERPL-SCNC: 4.4 MMOL/L (ref 3.5–5.2)
PROT SERPL-MCNC: 6.9 G/DL (ref 6–8.5)
PSA SERPL-MCNC: 1 NG/ML (ref 0–4)
RBC # BLD AUTO: 4.7 X10E6/UL (ref 4.14–5.8)
SODIUM SERPL-SCNC: 142 MMOL/L (ref 134–144)
TRIGL SERPL-MCNC: 208 MG/DL (ref 0–149)
TSH SERPL DL<=0.005 MIU/L-ACNC: 2.74 UIU/ML (ref 0.45–4.5)
VLDLC SERPL CALC-MCNC: 34 MG/DL (ref 5–40)
WBC # BLD AUTO: 7.7 X10E3/UL (ref 3.4–10.8)

## 2024-12-11 NOTE — PROGRESS NOTES
Chief Complaint   Patient presents with   • Implantable Defibrillator     Medtronic single chamber       Visit Vitals  /80   Ht 5' 10\" (1.778 m)   Wt 103.9 kg (229 lb)   BMI 32.86 kg/m²       Rafiq is a 72 year old male here for follow up single chamber ICD that was originally implanted in 2007 for ischemic cardiomyopathy and inducilbe SMVT. He has CAD, s/p CABG and is status post a drug-eluting stent placement of the left internal mammary artery and the balloon angioplasty of the left circumflex 12/11/2018     He is s/p ICD generator change in 10/2012.  He also has a history of  AF -was on Tikosyn, but ran out of medication 9/2015. He was non-compliant in following up and therefore has not taken Tikosyn since 9/2015. He prefered to not take Tikosyn anymore. He was started on amiodarone (unclear when) and continues on 200 mg daily as well as xarelto.      His ICD alert tones started and he was seen at [place] with ICD noted to be at RRT. He is scheduled for ICD generator change on 2/24/2022. Patient was not see for a follow-up after generator change. Upon chart review, Xarelto was discontinued 10/2023 by Dr. Pinto as he was not taking it. He continues on amiodarone 200 mg daily and Ranexa 500 mg BID.    Patient is doing well today. Tolerating medications. No complaints.           Review of Systems   Constitutional:  Negative for malaise/fatigue.   Respiratory:  Negative for shortness of breath.    Cardiovascular:  Negative for chest pain, palpitations, orthopnea and leg swelling.   Neurological:  Negative for dizziness.       Physical Exam  Constitutional:       Appearance: Normal appearance.   HENT:      Head: Normocephalic and atraumatic.   Cardiovascular:      Rate and Rhythm: Normal rate and regular rhythm.   Pulmonary:      Effort: Pulmonary effort is normal. No respiratory distress.   Neurological:      Mental Status: He is alert and oriented to person, place, and time.   Psychiatric:         Behavior:  Physical Therapy Initial Evaluation and Plan of Care    Patient: David Robles   : 1960  Diagnosis/ICD-10 Code:  Quadriceps tendon rupture, right, initial encounter [S76.111A]  Referring practitioner: David Varela MD  Outcome Measure:LEFS:    Diagnoses and all orders for this visit:    1. Quadriceps tendon rupture, right, initial encounter (Primary)    2. Acute pain of right knee    3. Difficulty walking         Subjective Evaluation    History of Present Illness  Mechanism of injury: Pt reports to therapy with R quadriceps tendon rupture repair on 24 following pt falling on the stairs in preparation to move resulting in the rupture. Pt is set to move to [place] within the next couple of weeks. Pt reports that since the repair he has not bent his knee and has just recently starting WB through his RLE. No previous injury to his RLE.No numbness or tingling noted no issues with sleeping      Aggravating factors: walking, standing, activity    Alleviating factors: rest, ice, elevation        Patient Occupation: HR Quality of life: good    Pain  Current pain ratin  At best pain ratin  At worst pain ratin    Social Support  Lives with: spouse    Patient Goals  Patient goals for therapy: increased motion, decreased pain, increased strength, independence with ADLs/IADLs, return to sport/leisure activities, improved balance, decreased edema and return to work             Objective          Observations     Right Knee   Positive for atrophy, edema and incision.     Additional Knee Observation Details  No signs of infection    Neurological Testing     Sensation     Knee   Left Knee   Intact: Light touch    Right Knee   Intact: light touch     Active Range of Motion   Left Knee   Flexion: 135 degrees   Extension: 5 degrees     Right Knee   Flexion: 56 degrees   Extensor la degrees     Patellar Mobility   Left Knee Patellar tendons within functional limits include the medial, lateral,  Behavior normal.         Thought Content: Thought content normal.         Judgment: Judgment normal.          Creatinine (mg/dL)   Date Value   03/29/2024 0.98     Ejection Fraction   Date Value Ref Range Status   07/11/2019 35.0 % Final   Echo at [facility] 5/28/2020 - LVEF 30%    NM myocardial perfusion testing at [facility] 5/29/2020 -   Hypokinesis from the apical and distal portions with moderate LV systolic dysfunction.  On perfusion imaging there is a large fixed defect in the apical portion extending into the inferolateral wall.  No reversibility.  There is also a large defect involving the inferior wall from the basal to distal portion.  No reversibility seen    Creat at [facility] 1/31/2022 - 1.13      Cardiac Cath 10/30/2020:    There is no aortic valve stenosis.  Mid LM to Dist LM lesion with 65% stenosis.  Prox LAD lesion with 100% stenosis.  Ost Cx to Prox Cx lesion with 60% stenosis.  Ost RCA to Prox RCA lesion with 65% stenosis.  Dist RCA lesion with 50% stenosis.  Mid Cx lesion with 100% stenosis.       Assessment & Plan     Medtronic single chamber ICD  Device was checked during today's visit. It shows adequate battery longevity. Stable lead trends, sensing, and impedances were noted. Remote check in 3 months. Follow up in 6 months.     Summary:   IN PERSON DEVICE CHECK:     The device was programmed to check thresholds, lead measurements and assess underlying rhythm.  The in person device check shows NORMAL FUNCTION.  Presenting Rhythm:  VS  Underlying Rhythm:  NSR  Battery Voltage/Longevity:  11.8 yrs  Percent Pacing:  : 0.1%  Tachycardia Detections/Episodes:  none  See device parameters/results in the media    Remote connectivity verified       High risk medication use - Amiodarone  High risk medication-Amiodarone  Chart reviewed for TSH, LFT,  and PFT/chest Xray for monitoring of amiodarone side effects.    TSH (mcUnits/mL)   Date Value   10/28/2020 2.097     GOT/AST (Units/L)   Date Value  superior and inferior.     Right Knee Hypomobile in the medial, lateral, superior and inferior patellar tendon(s).     Patellar Static Positioning   Left Knee: WFL  Right Knee: WFL    Strength/Myotome Testing     Left Hip   Planes of Motion   Flexion: 5  External rotation: 5  Internal rotation: 5    Left Knee   Flexion: 5  Extension: 5  Quadriceps contraction: good    Right Knee   Quadriceps contraction: poor    Additional Strength Details  MMT on RLE not tested at this time.     Swelling     Left Knee Girth Measurement (cm)   Joint line: 41 cm  10 cm above joint line: 48 cm  10 cm below joint line: 36 cm    Right Knee Girth Measurement (cm)   Joint line: 42 cm  10 cm above joint line: 48 cm  10 cm below joint line: 35 cm    Ambulation   Weight-Bearing Status   Weight-Bearing Status (Right): weight-bearing as tolerated    Assistive device used: front-wheeled walker    Observational Gait   Gait: antalgic   Decreased walking speed, stride length, right stance time, right swing time and right step length.           Assessment & Plan       Assessment  Impairments: abnormal coordination, abnormal gait, abnormal muscle firing, abnormal muscle tone, abnormal or restricted ROM, activity intolerance, impaired balance, impaired physical strength, lacks appropriate home exercise program, pain with function, safety issue and weight-bearing intolerance   Functional limitations: carrying objects, lifting, sleeping, walking, pulling, pushing, uncomfortable because of pain, moving in bed, sitting, standing, stooping and unable to perform repetitive tasks   Assessment details: Patient is a 64 y.o. year old male who presents to therapy with R quadriceps tendon rupture repair on 11/13/24 .At initial evaluation  he presents with significant impairments including decreased RLE ROM, decreased RLE strength, impaired gait mechanics, impaired standing, walking, sitting, and activity tolerance, impaired LEFS score and pain. Impairments    03/29/2024 17     GPT/ALT (Units/L)   Date Value   03/29/2024 31     No results found for: \"GGTP\"  Alkaline Phosphatase (Units/L)   Date Value   03/29/2024 130 (H)     Bilirubin, Total (mg/dL)   Date Value   03/29/2024 1.1 (H)     CXR 6/12/24  IMPRESSION: No focal consolidation or effusion.     No sodium or potassium channel toxicity noted. EGM reviewed.  Tolerating well.   Reduce to 100 mg daily.         Atrial fibrillation  (CMD)  LA Size:  LA volume normal per echo 4/2017  CHADs Score:  3 (Age, CAD, DM)  EF:  Echo 4/2017 - 33%  Dates of Ablations:  None  Prior AA therapy:  Tikosyn  (current)    No recent events per device interrogation.    Assessment  Patient is doing well and feels stable from a rhythm standpoint. Reviewed labs and cardiac testing. To reduce long term side effects, will reduce Amiodarone. Pt agrees the best option at this time is to decrease Amiodarone. Pt prefers to stay off AC. He also has a risk of fall, so it is okay to stay off.     Recommendations    Reduce Amiodarone to 100 mg daily.  2. No restrictions for surgery from EP standpoint  3. Check TSH today.   4. Remote in 3 mo.  5. Follow up in 6 mo.    Ischemic cardiomyopathy  Ejection Fraction   Date Value Ref Range Status   07/11/2019 35.0 % Final   Echo at [facility] 5/28/2020 - LVEF 30%    On Entresto and Coreg.  S/p ICD implant.    On 6/14/2024, IRenee scribed the services personally performed by Wesley PIZARRO Sra, MD  The documentation recorded by the scribe accurately and completely reflects the service(s) I personally performed and the decisions made by me.   Labs and imaging data reviewed. Rhythm reviewed no evidence of Na or K channel toxicty     affect ADL/IADL's, functional activities, recreational activities, and community activities. Pt will benefit from skilled physical therapy to address impairments, decrease pain and restore function.  Prognosis: good    Goals  Plan Goals: Pt presents to PT with symptoms consistent with R quadriceps tendon rupture repair.  Pt would benefit from skilled PT intervention to address the deficits noted.     SHORT TERM GOALS: Time for Goal Achievement: 4 weeks    1.  Patient to be compliant with HEP.   2.  Pt able to ascend/descend steps and transfer with less knee pain < 5/10  3.  Increased hip joint mobility to allow for decreased stress at tibiofemoral joint  4.  Pt. to exhibit increased LE endurance/strength to 4+/5 to allow for increased ease with sit-stand and standing/walking > 30 minutes, such as walking in grocery    LONG TERM GOALS: Time for Goal Achievement: 8 weeks  1.  Pt to score < 15% perceived disability on  LEFS  2.  Patient able to ascend/descend steps and prolonged standing & cooking with pain < 2/10  3.  Pt to exhibit full knee AROM to allow for kneeling, bending squatting as is necessary for ADL's, IADL's and household activities.   4.  Pt to exhibit LE endurance/strength to 5/5 to allow for walking, steps and transfers to occur safely  5.  Pt to demonstrate increased stability of the knee to balance on foam as needed to traverse uneven terrain .      Plan  Therapy options: will be seen for skilled therapy services  Planned modality interventions: cryotherapy, electrical stimulation/Russian stimulation, high voltage pulsed current (pain management), thermotherapy (hydrocollator packs), TENS and ultrasound  Planned therapy interventions: abdominal trunk stabilization, ADL retraining, balance/weight-bearing training, body mechanics training, fine motor coordination training, flexibility, functional ROM exercises, home exercise program, gait training, IADL retraining, joint mobilization, therapeutic  activities, stretching, strengthening, spinal/joint mobilization, soft tissue mobilization, neuromuscular re-education, motor coordination training, manual therapy, postural training and transfer training  Frequency: 2x week  Duration in weeks: 10  Treatment plan discussed with: patient  Plan details: PT to hand off to therapists in [place] once pt moves within the next few weeks.         History # of Personal Factors and/or Comorbidities: MODERATE (1-2)  Examination of Body System(s): # of elements: LOW (1-2)  Clinical Presentation: STABLE   Clinical Decision Making: LOW     Timed:         Manual Therapy:    5     mins  04413;     Therapeutic Exercise:    10     mins  73019;     Neuromuscular Michelle:    10    mins  12255;    Therapeutic Activity:          mins  36039;     Gait Training:           mins  02554;     Ultrasound:          mins  65082;    Ionto                                   mins   99761  Self Care                       10     mins   24002        Un-Timed:  Electrical Stimulation:         mins  19029 ( );  Dry Needling          mins self-pay  Traction          mins 20120  Low Eval     25     Mins  96060  Mod Eval          Mins  96058  High Eval                            Mins  53442  Re-Eval                               mins  39536        Timed Treatment:   35  mins   Total Treatment:     60  mins  PT SIGNATURE: Risa Glynn PT, DPT          DATE TREATMENT INITIATED: 12/11/2024    Initial Certification  Certification Period: 3/11/2025  I certify that the therapy services are furnished while this patient is under my care.  The services outlined above are required by this patient, and will be reviewed every 90 days.     PHYSICIAN: David Varela MD      DATE:     Please sign and return via fax to [phone].. Thank you, [facility].

## 2024-12-11 NOTE — TELEPHONE ENCOUNTER
Spoke to pharmacy, he doesn't know why they are on hold, but he will go on ahead and fill them. Might be ready tonight or tomorrow morning.    Left detailed message for pt

## 2024-12-13 ENCOUNTER — TREATMENT (OUTPATIENT)
Dept: PHYSICAL THERAPY | Facility: CLINIC | Age: 64
End: 2024-12-13
Payer: COMMERCIAL

## 2024-12-13 ENCOUNTER — PATIENT ROUNDING (BHMG ONLY) (OUTPATIENT)
Dept: FAMILY MEDICINE CLINIC | Facility: CLINIC | Age: 64
End: 2024-12-13
Payer: COMMERCIAL

## 2024-12-13 DIAGNOSIS — S76.111A QUADRICEPS TENDON RUPTURE, RIGHT, INITIAL ENCOUNTER: Primary | ICD-10-CM

## 2024-12-13 DIAGNOSIS — R26.2 DIFFICULTY WALKING: ICD-10-CM

## 2024-12-13 DIAGNOSIS — M25.561 ACUTE PAIN OF RIGHT KNEE: ICD-10-CM

## 2024-12-13 NOTE — PROGRESS NOTES
Good morning Mr. Robles,    My name is Haily, I am a medical assistant here at Dr. Bernal office. We hope your recent visit with us went smoothly.     If you have any questions or concerns, please feel free to reach out at [phone]. Please take the time to fill out the survey that will be sent to you to help us better your care. Thank you and have a great day!     KIRA Johansen

## 2024-12-13 NOTE — PROGRESS NOTES
Physical Therapy Daily Treatment Note    [address] [place] [address]    VISIT#: 2    Subjective   David Robles reports that he did well following his initial evaluation and that today is going to be his last session of therapy prior to moving to [place] on Monday.   Pain Rating (0/10): 0    Objective     See Exercise, Manual, and Modality Logs for complete treatment.     Patient Education: Progress of therapy and POC    Assessment/Plan  Pt demonstrated improvements in quad activation and progressed strengthening, ROM, and activity tolerance within the limitations of the protocol    Plan  Progress per Plan of Care and Progress strengthening /stabilization /functional activity            Timed:         Manual Therapy:    5     mins  62639;     Therapeutic Exercise:    15     mins  69597;     Neuromuscular Michelle:    15    mins  75160;    Therapeutic Activity:     10     mins  20063;     Gait Training:           mins  25021;     Ultrasound:          mins  30908;    Ionto                                   mins   43468  Self Care                            mins   47656    Un-Timed:  Electrical Stimulation:         mins  72242 ( );  Dry Needling          mins self-pay  Traction          mins 89437  Low Eval          Mins  25645  Mod Eval          Mins  72441  High Eval                            Mins  79564  Re-Eval                               mins  63518    Timed Treatment:   45  mins   Total Treatment:     45   mins    Risa Glynn PT, DPT  Physical Therapist

## (undated) DEVICE — INTENT TO BE USED WITH SUTURE MATERIAL FOR TISSUE CLOSURE: Brand: RICHARD-ALLAN® NEEDLE 1/2 CIRCLE TAPER

## (undated) DEVICE — SPNG GZ WOVN 4X4IN 12PLY 10/BX STRL

## (undated) DEVICE — BNDG ESMARK STRL 6INX12FT LF

## (undated) DEVICE — SPONGE,LAP,18"X18",DLX,XR,ST,5/PK,40/PK: Brand: MEDLINE

## (undated) DEVICE — ANTIBACTERIAL UNDYED BRAIDED (POLYGLACTIN 910), SYNTHETIC ABSORBABLE SURGICAL SUTURE: Brand: COATED VICRYL

## (undated) DEVICE — PATIENT RETURN ELECTRODE, SINGLE-USE, CONTACT QUALITY MONITORING, ADULT, WITH 9FT CORD, FOR PATIENTS WEIGING OVER 33LBS. (15KG): Brand: MEGADYNE

## (undated) DEVICE — BNDG ELAS CO-FLEX SLF ADHR 6IN 5YD LF STRL

## (undated) DEVICE — DRSNG WND GZ CURAD OIL EMULSION 3X3IN STRL

## (undated) DEVICE — STPLR SKIN VISISTAT WD 35CT

## (undated) DEVICE — BIT DRL FOR USE W LOCK SCRW3MM 2.2MM

## (undated) DEVICE — PENCL SMOKE/EVAC MEGADYNE TELESCP 10FT

## (undated) DEVICE — STOCKINETTE,IMPERVIOUS,12X48,STERILE: Brand: MEDLINE

## (undated) DEVICE — IMMOB KN 3PNL DLX CANVS 22IN BLU

## (undated) DEVICE — GLV SURG SENSICARE PI LF PF 8 GRN STRL

## (undated) DEVICE — UNDERCAST PADDING: Brand: DEROYAL

## (undated) DEVICE — SUT MNCRYL 2/0 SH 27IN Y317H

## (undated) DEVICE — SUT VIC 2/0 CT2 27IN J269H

## (undated) DEVICE — IMPLANTABLE DEVICE
Type: IMPLANTABLE DEVICE | Site: FEMUR | Status: NON-FUNCTIONAL
Removed: 2024-11-13

## (undated) DEVICE — SKIN PREP TRAY W/CHG: Brand: MEDLINE INDUSTRIES, INC.

## (undated) DEVICE — DRP SURG U/DRP INVISISHIELD PA 48X52IN

## (undated) DEVICE — GLV SURG BIOGEL LTX PF 8

## (undated) DEVICE — 450 ML BOTTLE OF 0.05% CHLORHEXIDINE GLUCONATE IN 99.95% STERILE WATER FOR IRRIGATION, USP AND APPLICATOR.: Brand: IRRISEPT ANTIMICROBIAL WOUND LAVAGE

## (undated) DEVICE — DRAPE,U/ SHT,SPLIT,PLAS,STERIL: Brand: MEDLINE

## (undated) DEVICE — BNDG,ELSTC,MATRIX,STRL,6"X5YD,LF,HOOK&LP: Brand: MEDLINE

## (undated) DEVICE — DRSNG GZ CURAD XEROFORM NONADHR OVERWRAP 5X9IN

## (undated) DEVICE — BNDG,ELSTC,MATRIX,STRL,4"X5YD,LF,HOOK&LP: Brand: MEDLINE

## (undated) DEVICE — DISPOSABLE TOURNIQUET CUFF SINGLE BLADDER, SINGLE PORT AND QUICK CONNECT CONNECTOR: Brand: COLOR CUFF

## (undated) DEVICE — PK ORTHO MINOR TOWER 40